# Patient Record
Sex: MALE | Race: WHITE | NOT HISPANIC OR LATINO | Employment: FULL TIME | URBAN - METROPOLITAN AREA
[De-identification: names, ages, dates, MRNs, and addresses within clinical notes are randomized per-mention and may not be internally consistent; named-entity substitution may affect disease eponyms.]

---

## 2018-10-05 ENCOUNTER — TRANSCRIBE ORDERS (OUTPATIENT)
Dept: OCCUPATIONAL THERAPY | Facility: CLINIC | Age: 33
End: 2018-10-05

## 2018-10-05 DIAGNOSIS — S46.912D STRAIN OF LEFT SHOULDER, SUBSEQUENT ENCOUNTER: Primary | ICD-10-CM

## 2018-10-09 ENCOUNTER — TREATMENT (OUTPATIENT)
Dept: PHYSICAL THERAPY | Facility: CLINIC | Age: 33
End: 2018-10-09

## 2018-10-09 DIAGNOSIS — S46.912D STRAIN OF LEFT SHOULDER, SUBSEQUENT ENCOUNTER: Primary | ICD-10-CM

## 2018-10-09 PROCEDURE — 97161 PT EVAL LOW COMPLEX 20 MIN: CPT | Performed by: PHYSICAL THERAPIST

## 2018-10-09 PROCEDURE — 97110 THERAPEUTIC EXERCISES: CPT | Performed by: PHYSICAL THERAPIST

## 2018-10-09 PROCEDURE — 97530 THERAPEUTIC ACTIVITIES: CPT | Performed by: PHYSICAL THERAPIST

## 2018-10-09 PROCEDURE — 97035 APP MDLTY 1+ULTRASOUND EA 15: CPT | Performed by: PHYSICAL THERAPIST

## 2018-10-09 PROCEDURE — 97033 APP MDLTY 1+IONTPHRSIS EA 15: CPT | Performed by: PHYSICAL THERAPIST

## 2018-10-09 NOTE — PROGRESS NOTES
Physical Therapy Initial Evaluation and Plan of Care    Patient: Florin Edmonds   : 1985  Diagnosis/ICD-10 Code:  Strain of left shoulder, subsequent encounter [S46.912D]  Referring practitioner: GEOFF Blake    Subjective Evaluation    History of Present Illness  Date of onset: 2018  Mechanism of injury: Partner dropped other end of sofa, which caused his end to pull him forward with arm down to floor. - Immediate pain in anterior shoulder.  Finished rest of the deliveries, reported issue the next morning  BW 18 - Flexeril ,  BW 10/3/18 - Voltaren and restrictions    NO specific outside activities, hikes      Patient Occupation:  - [employer] - 4 years   Precautions and Work Restrictions: off due to no restricted duty work availablePain  Current pain ratin  At best pain ratin  At worst pain ratin  Location: Anterior shoulder sharp pain, pops, throbs througjhout the shoulder, no sense of locking or instability  Quality: throbbing, sharp, knife-like, dull ache and tight  Relieving factors: rest, support, change in position and medications  Aggravating factors: outstretched reach and overhead activity (abduction rotation positioning , arm behind back)  Progression: improved    Social Support  Lives with: spouse    Hand dominance: right    Diagnostic Tests  No diagnostic tests performed    Treatments  Previous treatment: medication  Current treatment: medication and physical therapy  Patient Goals  Patient goal: Back to work without pain       Objective       Postural Observations  Seated posture: fair  Standing posture: fair    Additional Postural Observation Details  Protracted shoulders, downward rotation as well    Observations   Left Shoulder   Positive for atrophy.     Additional Observation Details  Mild atrophy in infraspinatus    Palpation   Left   Tenderness of the biceps and supraspinatus.     Tenderness     Left Shoulder   Tenderness in the biceps  "tendon (proximal), bicipital groove and supraspinatus tendon. No tenderness in the AC joint.     Cervical/Thoracic Screen   Cervical range of motion within normal limits    Neurological Testing     Sensation     Shoulder   Left Shoulder   Intact: light touch    Active Range of Motion   Left Shoulder   Flexion: 170 degrees   Abduction: 170 degrees   External rotation 90°: 90 degrees   Internal rotation 90°: 65 degrees with pain    Scapular Mobility   Left Shoulder   Scapular Mobility with Shoulder to 90° FF   Scapular winging: minimal  Downward rotation: excessive    Strength/Myotome Testing     Left Shoulder     Planes of Motion   Flexion: 4   Extension: 4+   Abduction: 4-   External rotation at 0°: 4-   Internal rotation at 0°: 5     Isolated Muscles   Supraspinatus: 4-     Tests     Left Shoulder   Positive empty can and Hawkin's.   Negative AC shear, crank, crossover, drop arm, Speed's and sulcus sign.      Assessment & Plan     Assessment  Impairments: abnormal muscle firing, abnormal muscle tone, abnormal or restricted ROM, activity intolerance, impaired physical strength, lacks appropriate home exercise program, pain with function and safety issue  Assessment details: 33 y.o. Male with right shoulder strain after lifting a sofa when delivery partner lost control and dropped his load presents with\" 1. Intermittent left anterior/superior shoulder pain, 2. Near full motion with slight limitation in IR, 3. Tenderness to palpation of the supraspinatus and biceps tendons, 4. Positive Impingement signs as well as positive Empty Can test, 5. Postural dysfunction, 6. History of right shoulder pathology, 7. 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  Patient will have functional pain free shoulder AROM  Patient will be able to lift and carry 50# without increased symptoms  Patient will return to work with min/no restrictions        Plan  Therapy options: will be seen for skilled physical therapy services  Planned modality interventions: ultrasound and iontophoresis  Planned therapy interventions: manual therapy, soft tissue mobilization, strengthening, stretching, home exercise program and therapeutic activities  Frequency: 3x week  Duration in visits: 12  Duration in weeks: 3  Treatment plan discussed with: patient  Plan details: Patient issued written HEP of exercises performed in clinic today      Manual Therapy:    0     mins  41793;  Therapeutic Exercise:    20     mins  94563;     Neuromuscular Phuc:    0    mins  79895;    Therapeutic Activity:     10     mins  89138;   Discussion of proper posture, anatomy review with model, probable causes of symptoms and rehab process    Evaluation Time:     25  mins  Timed Treatment:   53   mins   Total Treatment:     90   mins    PT SIGNATURE: Latoya Celaya, PT   DATE TREATMENT INITIATED: 10/9/2018    Initial Certification  Certification Period: 1/7/2019  I certify that the therapy services are furnished while this patient is under my care.  The services outlined above are required by this patient, and will be reviewed every 90 days.     PHYSICIAN: Valente Correa PA ________________________________     DATE: ____________________________-    Please sign and return via fax to [phone].. Thank you, [facility].

## 2018-10-10 ENCOUNTER — TREATMENT (OUTPATIENT)
Dept: PHYSICAL THERAPY | Facility: CLINIC | Age: 33
End: 2018-10-10

## 2018-10-10 DIAGNOSIS — S46.912D STRAIN OF LEFT SHOULDER, SUBSEQUENT ENCOUNTER: Primary | ICD-10-CM

## 2018-10-10 PROCEDURE — 97033 APP MDLTY 1+IONTPHRSIS EA 15: CPT | Performed by: PHYSICAL THERAPIST

## 2018-10-10 PROCEDURE — 97035 APP MDLTY 1+ULTRASOUND EA 15: CPT | Performed by: PHYSICAL THERAPIST

## 2018-10-10 PROCEDURE — 97110 THERAPEUTIC EXERCISES: CPT | Performed by: PHYSICAL THERAPIST

## 2018-10-10 NOTE — PROGRESS NOTES
Physical Therapy Daily Progress Note    VISIT#: 2    Subjective   Florin Edmonds reports: that his shoulder is sore this morning.  States that the tape feels supportive.  Notes that it did not start bothering him until this morning.       Objective   Tenderness in biceps and supraspinatus tendons    Positive Rodrigo Brent Impingement    See Exercise, Manual, and Modality Logs for complete treatment.     Patient Education:Posture    Assessment/Plan  Some relief with tape in place for support.  Needs improved posture.  Is symptomatic on his right shoulder as well from old injury so should benefit from scapular stabilization bilaterally.    Progress strengthening /stabilization /functional activity           Manual Therapy:    3     mins  51213;  Therapeutic Exercise:    25/40     mins  82767;     Neuromuscular Phuc:    0    mins  12893;    Therapeutic Activity:     0     mins  82553;       Timed Treatment:   51   mins   Total Treatment:     70   mins    Latoya Celaya, PT  [place] License # [id number]  Physical Therapist

## 2018-10-11 ENCOUNTER — TREATMENT (OUTPATIENT)
Dept: PHYSICAL THERAPY | Facility: CLINIC | Age: 33
End: 2018-10-11

## 2018-10-11 DIAGNOSIS — S46.912D STRAIN OF LEFT SHOULDER, SUBSEQUENT ENCOUNTER: Primary | ICD-10-CM

## 2018-10-11 PROCEDURE — 97110 THERAPEUTIC EXERCISES: CPT | Performed by: PHYSICAL THERAPIST

## 2018-10-11 PROCEDURE — 97033 APP MDLTY 1+IONTPHRSIS EA 15: CPT | Performed by: PHYSICAL THERAPIST

## 2018-10-11 PROCEDURE — 97035 APP MDLTY 1+ULTRASOUND EA 15: CPT | Performed by: PHYSICAL THERAPIST

## 2018-10-11 NOTE — PROGRESS NOTES
Physical Therapy Daily Progress Note    VISIT#: 3    Subjective   Florin Edmonds reports: that his shoulder was more sore yesterday with a sharp pain in the superior/lateral aspect of the shoulder.  Sharp pain only lasted a few seconds.  States that his WC  was going to schedule an MRI for him.        Objective   Tenderness to palpation in the biceps and SS tendons    See Exercise, Manual, and Modality Logs for complete treatment.     Patient Education: Posture    Assessment/Plan  Patient with persistent signs of impingement in the shoulder and potential rotator/biceps pathology.    To MD after next session.           Manual Therapy:    3     mins  44276;  Therapeutic Exercise:    25/35     mins  74186;     Neuromuscular Phuc:    0    mins  86575;    Therapeutic Activity:     0     mins  25195;       Timed Treatment:   51   mins   Total Treatment:     65   mins    Latoya Celaya, PT  [place] License # [id number]  Physical Therapist

## 2018-10-12 ENCOUNTER — TREATMENT (OUTPATIENT)
Dept: PHYSICAL THERAPY | Facility: CLINIC | Age: 33
End: 2018-10-12

## 2018-10-12 DIAGNOSIS — S46.912D STRAIN OF LEFT SHOULDER, SUBSEQUENT ENCOUNTER: Primary | ICD-10-CM

## 2018-10-12 PROCEDURE — 97035 APP MDLTY 1+ULTRASOUND EA 15: CPT | Performed by: PHYSICAL THERAPIST

## 2018-10-12 PROCEDURE — 97530 THERAPEUTIC ACTIVITIES: CPT | Performed by: PHYSICAL THERAPIST

## 2018-10-12 PROCEDURE — 97110 THERAPEUTIC EXERCISES: CPT | Performed by: PHYSICAL THERAPIST

## 2018-10-12 PROCEDURE — 97033 APP MDLTY 1+IONTPHRSIS EA 15: CPT | Performed by: PHYSICAL THERAPIST

## 2018-10-12 NOTE — PROGRESS NOTES
"MD Letter    Date of Initial Visit: Type: THERAPY  Noted: 10/9/2018  Today's Date: 10/12/2018  Patient seen for 4 sessions    Treatment has included: therapeutic exercise, manual therapy, ultrasound, iontophoresis and positional taping    Subjective   States that his shoulder pain seems to be increasing.  States that he has a sharp pain in the anterior shoulder and has a \"pulsating pain\"  in the superior shoulder.  He states that the pain awakened him from sleep last night.  Reports popping in the superior shoulder with the arm in abducted and rotated.      Objective - he presents with protracted shoulder posture  Shoulder AROM - full in all planes of motion but has pain at end ranges of flexion, abduction and internal rotation  Strength - 4/5 in abduction and external rotation but is limited by pain  Sensation - intact  Palpation - tenderness in the biceps tendon, supraspinatus tendon and sense of popping in the shoulder with elevation   Special tests - positive Empty Can, Speeds, O'Briens and Patel Brent Impingement tests  Activity tolerances - he is able to do all self care skills with minimal pain but has pain with reaching out to the side to even  a drinking glass.  He is able to lift 20# floor to waist without pain but has pian with lifting even 15# to chest level.    Assessment/Plan  Patient has demonstrated minimal improvement since the initiation of therapy.  The pain has  Actually increased in the past few days.  The motion has increased to full.  The activity tolerances have remained quite limited compared to the demands of his job as a .  I feel that the patient would benefit from further medical management.  If you have any questions concerning the care, please do not hesitate to contact me.          PT Signature: Latoya Celaya, PT        Manual Therapy:    3     mins  39996;  Therapeutic Exercise:    15     mins  25809;     Neuromuscular Phuc:    0    mins  09857;  "   Therapeutic Activity:     10     mins  09707;   Assessed for MD    Timed Treatment:   51   mins   Total Treatment:     65   mins

## 2018-11-06 ENCOUNTER — TREATMENT (OUTPATIENT)
Dept: PHYSICAL THERAPY | Facility: CLINIC | Age: 33
End: 2018-11-06

## 2018-11-06 DIAGNOSIS — M75.82 ROTATOR CUFF TENDONITIS, LEFT: Primary | ICD-10-CM

## 2018-11-06 DIAGNOSIS — M75.42 IMPINGEMENT SYNDROME OF LEFT SHOULDER: ICD-10-CM

## 2018-11-06 PROCEDURE — 97110 THERAPEUTIC EXERCISES: CPT | Performed by: PHYSICAL THERAPIST

## 2018-11-06 PROCEDURE — 97033 APP MDLTY 1+IONTPHRSIS EA 15: CPT | Performed by: PHYSICAL THERAPIST

## 2018-11-06 PROCEDURE — 97140 MANUAL THERAPY 1/> REGIONS: CPT | Performed by: PHYSICAL THERAPIST

## 2018-11-06 PROCEDURE — 97161 PT EVAL LOW COMPLEX 20 MIN: CPT | Performed by: PHYSICAL THERAPIST

## 2018-11-06 NOTE — PROGRESS NOTES
Physical Therapy Initial Evaluation and Plan of Care    Patient: Florin Edmonds   : 1985  Diagnosis/ICD-10 Code:  Rotator cuff tendonitis, left [M75.82]  Referring practitioner: Stewart Arreguin MD    Subjective Evaluation    History of Present Illness  Date of onset: 2018  Mechanism of injury: Carrying heavy sofa, partner dropped it causing all weight to go to his left arm pulling him down to the ground.  Immediate pain.  BW next day - anti inflammatories and restriction  PT the next week - some improvement - referred to Dr. Arreguin  MRI - Tendinopathy of RTC - Impingement syndrome, Did not note any true specific tearing per patient  Now taking Meloxicam daily and referred back to PT  Still off work based on restrictions  Has not continued his exercises as he was referred to ortho        Patient Occupation:  - [employer] x 4 years   Precautions and Work Restrictions: No lifting > 10-20#Pain  Current pain ratin  At best pain ratin  At worst pain ratin  Location: Superior/anterior shoulder, shoots into deltoid region  Quality: dull ache, throbbing, radiating, knife-like and pressure  Relieving factors: change in position, medications, rest and support  Aggravating factors: lifting, outstretched reach, overhead activity and repetitive movement (driving, clapping)  Progression: improved    Social Support  Lives with: spouse    Hand dominance: right    Diagnostic Tests  MRI studies: abnormal (tendinopathy)    Treatments  Previous treatment: medication and physical therapy  Current treatment: medication and physical therapy  Patient Goals  Patient goals for therapy: return to work  Patient goal: cleared for normal activiites           Objective       Postural Observations    Additional Postural Observation Details  Slight protraction of shoulders    Observations     Additional Observation Details  Blemishes noted in upper arm    Palpation   Left   Hypertonic in  the upper trapezius.   Tenderness of the latissimus and supraspinatus.     Tenderness     Left Shoulder   Tenderness in the infraspinatus tendon and subscapularis tendon.     Neurological Testing     Sensation     Shoulder   Left Shoulder   Intact: light touch    Active Range of Motion   Left Shoulder   Flexion: 170 degrees   Abduction: 170 degrees   External rotation 90°: 85 degrees   Internal rotation BTB: L2     Additional Active Range of Motion Details  Slight pain with end range IR    Strength/Myotome Testing     Left Shoulder     Planes of Motion   Flexion: 4+   Extension: 5   Abduction: 4   Adduction: 4+   External rotation at 0°: 4+   Internal rotation at 0°: 4+     Tests     Left Shoulder   Positive empty can, Hawkin's and painful arc.   Negative crossover, drop arm and Speed's.          Assessment & Plan     Assessment  Impairments: abnormal or restricted ROM, activity intolerance, impaired physical strength, lacks appropriate home exercise program and pain with function  Assessment details: 33 y.o. Male with left shoulder RTC tendinopathy after lifting a heavy sofa presents with: 1. Intermittent left shoulder pain, 2.  Slightly decreased shoulder motion, 3. Pain with outstretched and overhead reaching, 4. Positive Impingement signs,  5. Pain with Empty Can testing, 6. Decreased tolerance for heavy lifting as required for his job as a   Prognosis: good  Functional Limitations: carrying objects, lifting, pulling, pushing, reaching overhead and unable to perform repetitive tasks  Goals  Plan Goals: Short Term Goals: 2 weeks  Patient will be able to tolerate initial exercises  Patient will have pain <5/10  Patient will be able to reach behind his back without increased symptoms  Patient will be able to lift 20# to waist without increased symptoms    Long Term Goals: 6 weeks  Patient will be independent in performing home exercise program.  Patient will have functional pain free  shoulder AROM  Patient will be able to lift 50# to waist level without pain  Patient will return to work with min/no restrictions        Plan  Planned modality interventions: iontophoresis and cryotherapy  Planned therapy interventions: manual therapy, joint mobilization, home exercise program, strengthening and stretching  Frequency: 2x week  Duration in visits: 12  Duration in weeks: 6  Treatment plan discussed with: patient        Manual Therapy:    11     mins  72436;  Therapeutic Exercise:    20/30     mins  30765;     Neuromuscular Phuc:    0    mins  77687;    Therapeutic Activity:     0     mins  37478;       Evaluation Time:     20  mins  Timed Treatment:   46   mins   Total Treatment:     85   mins    PT SIGNATURE: Latoya Celaya, PT   DATE TREATMENT INITIATED: 11/6/2018    Initial Certification  Certification Period: 2/4/2019  I certify that the therapy services are furnished while this patient is under my care.  The services outlined above are required by this patient, and will be reviewed every 90 days.     PHYSICIAN: Stewart Arreguin MD____________________________________________      DATE: ________________________     Please sign and return via fax to [phone].. Thank you, [facility].

## 2018-11-13 ENCOUNTER — TREATMENT (OUTPATIENT)
Dept: PHYSICAL THERAPY | Facility: CLINIC | Age: 33
End: 2018-11-13

## 2018-11-13 DIAGNOSIS — M75.82 ROTATOR CUFF TENDONITIS, LEFT: Primary | ICD-10-CM

## 2018-11-13 DIAGNOSIS — M75.42 IMPINGEMENT SYNDROME OF LEFT SHOULDER: ICD-10-CM

## 2018-11-13 PROCEDURE — 97110 THERAPEUTIC EXERCISES: CPT | Performed by: PHYSICAL THERAPIST

## 2018-11-13 NOTE — PROGRESS NOTES
Physical Therapy Daily Progress Note    VISIT#: 2    Subjective   Florin Edmonds reports: that his shoulder is feeling better but notes that he has not lifted anything heavier than groceries.  Able to lie on the shoulder at night without pain. States that he still does have some popping in the shoulder but not pain with the pop.    Objective   Moderate tenderness in biceps tendon and minimal in supraspinatus tendon    See Exercise, Manual, and Modality Logs for complete treatment.     Patient Education: Posture    Assessment/Plan  Much improved function of the shoulder.  Tolerated strengthening exercises without increased pain.     Progress strengthening /stabilization /functional activity           Manual Therapy:    0     mins  18646;  Therapeutic Exercise:    40/55     mins  48473;     Neuromuscular Phuc:    0    mins  72945;    Therapeutic Activity:     0     mins  45729;       Timed Treatment:   40   mins   Total Treatment:     75   mins    Latoya Celaya, PT  [place] License # [id number]  Physical Therapist

## 2018-11-16 ENCOUNTER — TREATMENT (OUTPATIENT)
Dept: PHYSICAL THERAPY | Facility: CLINIC | Age: 33
End: 2018-11-16

## 2018-11-16 DIAGNOSIS — M75.42 IMPINGEMENT SYNDROME OF LEFT SHOULDER: ICD-10-CM

## 2018-11-16 DIAGNOSIS — M75.82 ROTATOR CUFF TENDONITIS, LEFT: Primary | ICD-10-CM

## 2018-11-16 PROCEDURE — 97110 THERAPEUTIC EXERCISES: CPT | Performed by: PHYSICAL THERAPIST

## 2018-11-16 NOTE — PROGRESS NOTES
Physical Therapy Daily Progress Note    VISIT#: 3    Subjective   Florin Edmonds reports: that his shoulder is feeling better.  States that he still has not done anything heavy but has had no sharp pain.       Objective   Full motion with slight pain at end range IR    Palpable pop/click with elevation    See Exercise, Manual, and Modality Logs for complete treatment.     Patient Education: need for scapular stabilization    Assessment/Plan  Patient with improved scapular stability as he did not have much difficulty with weighted ball on the wall today.    Progress strengthening /stabilization /functional activity           Manual Therapy:    0     mins  34281;  Therapeutic Exercise:    40/60     mins  11837;     Neuromuscular Phuc:    0    mins  65590;    Therapeutic Activity:     0     mins  81325;       Timed Treatment:   40   mins   Total Treatment:     70   mins    Latoya Celaya, PT  [place] License # [id number]  Physical Therapist

## 2018-11-19 ENCOUNTER — TREATMENT (OUTPATIENT)
Dept: PHYSICAL THERAPY | Facility: CLINIC | Age: 33
End: 2018-11-19

## 2018-11-19 DIAGNOSIS — M75.42 IMPINGEMENT SYNDROME OF LEFT SHOULDER: ICD-10-CM

## 2018-11-19 DIAGNOSIS — M75.82 ROTATOR CUFF TENDONITIS, LEFT: Primary | ICD-10-CM

## 2018-11-19 PROCEDURE — 97110 THERAPEUTIC EXERCISES: CPT | Performed by: PHYSICAL THERAPIST

## 2018-11-19 PROCEDURE — 97140 MANUAL THERAPY 1/> REGIONS: CPT | Performed by: PHYSICAL THERAPIST

## 2018-11-19 NOTE — PROGRESS NOTES
Physical Therapy Daily Progress Note    VISIT#: 4    Subjective   Florin Kendal reports: that his arm feels good but notes that he has not been doing any heavy activity at home.  States that he is eager to RTW.      Objective   Shoulder equals LUE ezcept for IR which is only slightly limited    See Exercise, Manual, and Modality Logs for complete treatment.     Patient Education: Need for increased lifting    Assessment/Plan  Florin continues to show progress in shoulder rehab from impingement of RTC tendons.  Needs to increase strength to be able to lift furniture as a  from [employer].      Progress strengthening /stabilization /functional activity           Manual Therapy:    11     mins  96358;  Therapeutic Exercise:    40/50     mins  69178;     Neuromuscular Phuc:    0    mins  90557;    Therapeutic Activity:     0     mins  46785;       Timed Treatment:   51   mins   Total Treatment:     80   mins    Latoya Celaya, PT  [place] License # [id number]  Physical Therapist

## 2018-11-21 ENCOUNTER — TREATMENT (OUTPATIENT)
Dept: PHYSICAL THERAPY | Facility: CLINIC | Age: 33
End: 2018-11-21

## 2018-11-21 DIAGNOSIS — S46.912D STRAIN OF LEFT SHOULDER, SUBSEQUENT ENCOUNTER: ICD-10-CM

## 2018-11-21 DIAGNOSIS — M75.82 ROTATOR CUFF TENDONITIS, LEFT: Primary | ICD-10-CM

## 2018-11-21 DIAGNOSIS — M75.42 IMPINGEMENT SYNDROME OF LEFT SHOULDER: ICD-10-CM

## 2018-11-21 PROCEDURE — 97140 MANUAL THERAPY 1/> REGIONS: CPT | Performed by: PHYSICAL THERAPIST

## 2018-11-21 PROCEDURE — 97110 THERAPEUTIC EXERCISES: CPT | Performed by: PHYSICAL THERAPIST

## 2018-11-21 NOTE — PROGRESS NOTES
Physical Therapy Daily Progress Note    Visit # : 5  Florin Edmonds reports: my shoulder is feeling better but my FA is sore from ex program.     Subjective     Objective   See Exercise, Manual, and Modality Logs for complete treatment.   No palp tenderness R wrist extensors/brachioradialis following manual interventions/ex.    Assessment/Plan  Pt demonstrated good tolerance of exercises today though did become fatigued after completing 3/4 of program. FA complaints which were likely due to introduction of box lifts/carries last visits in order to prepare for critical job demands as a [employer] .   Progress strengthening /stabilization /functional activity  Progress weight          Manual Therapy:    10     mins  49156;  Therapeutic Exercise:    40/50     mins  67373;     Neuromuscular Phuc:    -    mins  81210;    Therapeutic Activity:     -     mins  14058;     Gait Training:      -     mins  90247;     Ultrasound:     -     mins  55669;    Electrical Stimulation:    -     mins  00165 ( );  Iontophoresis                 -     mins 82005      Timed Treatment:   50   mins   Total Treatment:     70   mins        Estephania Gary PT  Physical Therapist  [place] License # [id number]

## 2018-11-26 ENCOUNTER — TREATMENT (OUTPATIENT)
Dept: PHYSICAL THERAPY | Facility: CLINIC | Age: 33
End: 2018-11-26

## 2018-11-26 NOTE — PROGRESS NOTES
MD Letter - Discharge Summary    Date of Initial Visit: 11/6/18  Today's Date: 11/26/2018  Patient seen for 6 sessions    Treatment has included: therapeutic exercise, manual therapy, therapeutic activity and cryotherapy    Subjective   Florin states that his shoulder is much better than it was a few weeks ago.  He states that he still has occasional popping in the shoulder but it is no longer painful.  He does report a slight pinching sensation at times with elevation, but is not consistent.  He states that he feels that he is ready to return to his regular job as a  for [employer].     Objective   Shoulder AROM – full in all planes of motion with a slight pinch at end range IR  Strength – 5/5 throughout the shoulder  Sensation –intact  Palpation – tenderness in the biceps tendon and the anterior/lateral subacromial space  Special tests – slightly positive Empty can and Patel Brent tests  Activity tolerances -Flroin is able to lift and carry 50# from floor to waist level and 40# to shoulder level without increased pain.    Assessment/Plan  Patient has demonstrated significant improvement since the initiation of therapy.  The pain has  Nearly resolved.  The motion has returned to full.  The activity tolerances have returned to work demand level.  I feel that the patient would benefit from discontinuing his formal therapy but discontinuing his HEP and progression back to the gym.  If you have any questions concerning the care, please do not hesitate to contact me.          PT Signature: Latoya Celaya, PT

## 2018-11-27 ENCOUNTER — TREATMENT (OUTPATIENT)
Dept: PHYSICAL THERAPY | Facility: CLINIC | Age: 33
End: 2018-11-27

## 2018-11-27 DIAGNOSIS — M75.42 IMPINGEMENT SYNDROME OF LEFT SHOULDER: ICD-10-CM

## 2018-11-27 DIAGNOSIS — M75.82 ROTATOR CUFF TENDONITIS, LEFT: Primary | ICD-10-CM

## 2018-11-27 PROCEDURE — 97530 THERAPEUTIC ACTIVITIES: CPT | Performed by: PHYSICAL THERAPIST

## 2018-11-27 PROCEDURE — 97110 THERAPEUTIC EXERCISES: CPT | Performed by: PHYSICAL THERAPIST

## 2018-11-27 NOTE — PROGRESS NOTES
Physical Therapy Daily Progress Note    VISIT#: 6    Subjective   Florin Edmonds reports: that his MD wanted him to continue therapy for one more week as he wanted him to do more heavy lifting.  He has relaeased him to full duty 12/3/18.      Objective   Full motion without pain today    Slight pain with resisted abduction    Slight weakness in shoulder flexion    See Exercise, Manual, and Modality Logs for complete treatment.     Patient Education: Need to increase resistance with weights    Assessment/Plan  Florin has full motion with minimal pain but based on heavy demands of his job will need an additional week of strengthening.    Progress strengthening /stabilization /functional activity           Manual Therapy:    0     mins  43412;  Therapeutic Exercise:    40/50     mins  11728;     Neuromuscular Phuc:    0    mins  28260;    Therapeutic Activity:     10     mins  12581;       Timed Treatment:   50   mins   Total Treatment:     90   mins    Latoya Celaya, PT  [place] License # [id number]  Physical Therapist

## 2018-11-29 ENCOUNTER — TREATMENT (OUTPATIENT)
Dept: PHYSICAL THERAPY | Facility: CLINIC | Age: 33
End: 2018-11-29

## 2018-11-29 DIAGNOSIS — M75.82 ROTATOR CUFF TENDONITIS, LEFT: Primary | ICD-10-CM

## 2018-11-29 DIAGNOSIS — M75.42 IMPINGEMENT SYNDROME OF LEFT SHOULDER: ICD-10-CM

## 2018-11-29 PROCEDURE — 97140 MANUAL THERAPY 1/> REGIONS: CPT | Performed by: PHYSICAL THERAPIST

## 2018-11-29 PROCEDURE — 97110 THERAPEUTIC EXERCISES: CPT | Performed by: PHYSICAL THERAPIST

## 2018-11-29 NOTE — PROGRESS NOTES
Physical Therapy Daily Progress Note    VISIT#: 7    Subjective   Florin Edmonds reports: that he was not sore after his last session.  States that he is encouraged now about RTW next week.       Objective   Full motion except for IR which is slightly limited    See Exercise, Manual, and Modality Logs for complete treatment.     Patient Education: need for continued exercise after therapy    Assessment/Plan  Florin continues to do well with shoulder rehab.  Able to increase weights and reps of exercise and no noted increase in symptoms.    Progress strengthening /stabilization /functional activity           Manual Therapy:    10     mins  03767;  Therapeutic Exercise:    40/55     mins  92422;     Neuromuscular Phuc:    0    mins  04033;    Therapeutic Activity:     0     mins  31557;       Timed Treatment:   50   mins   Total Treatment:     80   mins    Latoya Celaya, PT  [place] License # [id number]  Physical Therapist

## 2018-11-30 ENCOUNTER — TREATMENT (OUTPATIENT)
Dept: PHYSICAL THERAPY | Facility: CLINIC | Age: 33
End: 2018-11-30

## 2018-11-30 DIAGNOSIS — M75.82 ROTATOR CUFF TENDONITIS, LEFT: Primary | ICD-10-CM

## 2018-11-30 DIAGNOSIS — M75.42 IMPINGEMENT SYNDROME OF LEFT SHOULDER: ICD-10-CM

## 2018-11-30 PROCEDURE — 97110 THERAPEUTIC EXERCISES: CPT | Performed by: PHYSICAL THERAPIST

## 2018-11-30 PROCEDURE — 97530 THERAPEUTIC ACTIVITIES: CPT | Performed by: PHYSICAL THERAPIST

## 2018-11-30 NOTE — PROGRESS NOTES
Physical Therapy Daily Progress Note    VISIT#: 8    Subjective   Florin Edmonds reports: that he feels good and thinks that he is ready to RTW.  NO pain or soreness after last session.      Objective   Full shoulder motion    Persistent protracted shoulder posturing but improves with attention to posture    Strength - 5/5 throughout shoulder mm    Able to lift and carry 100# today F-w, carry 30 feet    See Exercise, Manual, and Modality Logs for complete treatment.     Patient Education: Posture - continue HEP    Assessment/Plan  Florin has done extremely well with therapy.   His motion is full and his strength is 5/5.  He is independent in his HEP.  All goals have been met.     DC PT at this time as patient is RTW next week           Manual Therapy:    0     mins  09737;  Therapeutic Exercise:    40/50     mins  38666;     Neuromuscular Phuc:    0    mins  40689;    Therapeutic Activity:     10     mins  11221;   Assessed for work status    Timed Treatment:   50   mins   Total Treatment:     70   mins    Latoya Celaya, PT  [place] License # [id number]  Physical Therapist

## 2019-10-01 ENCOUNTER — TRANSCRIBE ORDERS (OUTPATIENT)
Dept: OCCUPATIONAL THERAPY | Facility: CLINIC | Age: 34
End: 2019-10-01

## 2019-10-01 DIAGNOSIS — S46.912S LEFT SHOULDER STRAIN, SEQUELA: Primary | ICD-10-CM

## 2019-10-02 ENCOUNTER — TREATMENT (OUTPATIENT)
Dept: PHYSICAL THERAPY | Facility: CLINIC | Age: 34
End: 2019-10-02

## 2019-10-02 DIAGNOSIS — S46.912S LEFT SHOULDER STRAIN, SEQUELA: ICD-10-CM

## 2019-10-02 DIAGNOSIS — S46.912D STRAIN OF LEFT SHOULDER, SUBSEQUENT ENCOUNTER: Primary | ICD-10-CM

## 2019-10-02 PROCEDURE — 97110 THERAPEUTIC EXERCISES: CPT | Performed by: PHYSICAL THERAPIST

## 2019-10-02 PROCEDURE — 97035 APP MDLTY 1+ULTRASOUND EA 15: CPT | Performed by: PHYSICAL THERAPIST

## 2019-10-02 PROCEDURE — 97161 PT EVAL LOW COMPLEX 20 MIN: CPT | Performed by: PHYSICAL THERAPIST

## 2019-10-02 PROCEDURE — 97033 APP MDLTY 1+IONTPHRSIS EA 15: CPT | Performed by: PHYSICAL THERAPIST

## 2019-10-02 NOTE — PROGRESS NOTES
Physical Therapy Initial Evaluation and Plan of Care    Patient: Florin Edmonds   : 1985  Diagnosis/ICD-10 Code:  Strain of left shoulder, subsequent encounter [S46.912D]  Referring practitioner: Hussein Rodriguez MD    Subjective Evaluation    History of Present Illness  Date of onset: 2019  Mechanism of injury: Pulling metal legs with arms extended felt pop in left shoulder and had pain with the pop.  Pain increased and started shooting through the anterior and lateral shoulder.  BW next morning - ibu 800 and restrictions  Returned to MD yesterday - now on pred pack and restrictions  Had previous injury - similar 2018  NO specific outside activities - exhausted after work       Patient Occupation:  delivery [employer]   Precautions and Work Restrictions: no lifting >10#Pain  Current pain ratin  At best pain ratin  At worst pain ratin  Location: anterior and lateral shoulder, pops with abduction  Quality: sharp, dull ache and tight  Relieving factors: rest and ice  Aggravating factors: repetitive movement, outstretched reach, sleeping, lifting and overhead activity  Progression: no change    Hand dominance: right    Diagnostic Tests  No diagnostic tests performed    Treatments  Previous treatment: medication  Current treatment: medication and physical therapy  Patient Goals  Patient goal: shoulder to feel better       Objective       Postural Observations  Seated posture: fair  Standing posture: fair    Additional Postural Observation Details  Slight protraction of shoulders, no muscle wasting in shoulder    Palpation   Left   No palpable tenderness to the infraspinatus and triceps.   Hypertonic in the upper trapezius.   Tenderness of the anterior deltoid, biceps and supraspinatus.     Tenderness     Left Shoulder   Tenderness in the biceps tendon (proximal), bicipital groove and supraspinatus tendon.     Additional Tenderness Details  Most tenderness in the proximal  biceps tendon    Neurological Testing     Sensation     Shoulder   Left Shoulder   Intact: light touch    Active Range of Motion   Left Shoulder   Normal active range of motion    Additional Active Range of Motion Details  Pain and popping with elevation and IR    Joint Play   Left Shoulder  Joints within functional limits are the anterior capsule and posterior capsule.     Strength/Myotome Testing     Left Shoulder     Planes of Motion   Flexion: 4 (slight pain inhibition)   Extension: 5   Abduction: 4 (pain inhibition)   External rotation at 0°: 4+   Internal rotation at 0°: 5     Tests     Left Shoulder   Positive empty can, Hawkin's and Speed's.   Negative drop arm.      Assessment & Plan     Assessment  Impairments: activity intolerance, impaired physical strength, lacks appropriate home exercise program and pain with function  Assessment details: 34 y.o. Male with left shoulder strain after pulling a heavy object presents with: 1. Intermittent left shoulder pain, 2. Full active and passive shoulder motion, 3. Slight weakness in elevation and ER with pain inhibition with testing, 4. Tenderness to palpation in the biceps tendon and supraspinatus tendon insertion, 5. Positive Impingement signs, 6. 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  Patient will have functional pain free shoulder AROM  Patient will be able to lift and carry 50# without increased symptoms  Patient will return to work with min/no  restrictions        Plan  Therapy options: will be seen for skilled physical therapy services  Planned modality interventions: iontophoresis and ultrasound  Planned therapy interventions: manual therapy, strengthening, stretching, home exercise program and postural training  Frequency: 3x week  Duration in visits: 12  Duration in weeks: 4  Treatment plan discussed with: patient  Plan details: Patient issued tubing to perform his HEP    Manual Therapy:    0     mins  09916;  Therapeutic Exercise:    25     mins  46782;     Neuromuscular Phuc:    0    mins  75357;    Therapeutic Activity:     0     mins  83558;       Evaluation Time:     20  mins  Timed Treatment:   48   mins   Total Treatment:     75   mins    PT SIGNATURE: Latoya Celaya, PT   DATE TREATMENT INITIATED: 10/2/2019    Initial Certification  Certification Period: 12/31/2019  I certify that the therapy services are furnished while this patient is under my care.  The services outlined above are required by this patient, and will be reviewed every 90 days.     PHYSICIAN: Hussein Rodriguez MD ________________________________      DATE: ________________________    Please sign and return via fax to [phone].. Thank you, [facility].

## 2019-10-03 ENCOUNTER — TREATMENT (OUTPATIENT)
Dept: PHYSICAL THERAPY | Facility: CLINIC | Age: 34
End: 2019-10-03

## 2019-10-03 DIAGNOSIS — S46.912D STRAIN OF LEFT SHOULDER, SUBSEQUENT ENCOUNTER: Primary | ICD-10-CM

## 2019-10-03 PROCEDURE — 97110 THERAPEUTIC EXERCISES: CPT | Performed by: PHYSICAL THERAPIST

## 2019-10-03 PROCEDURE — 97033 APP MDLTY 1+IONTPHRSIS EA 15: CPT | Performed by: PHYSICAL THERAPIST

## 2019-10-03 PROCEDURE — 97035 APP MDLTY 1+ULTRASOUND EA 15: CPT | Performed by: PHYSICAL THERAPIST

## 2019-10-03 NOTE — PROGRESS NOTES
Physical Therapy Daily Progress Note    VISIT#: 2    Subjective   Florin Edmonds reports: that he still has the soreness in his arm with rotation and abduction.      Objective   Tenderness in anterior/lateral subacromial space    See Exercise, Manual, and Modality Logs for complete treatment.     Patient Education: posture    Assessment/Plan  Persistent sharp pain with elevation.  Poor posture causing further impingement in the shoulder.    Progress strengthening /stabilization /functional activity           Manual Therapy:    3     mins  70525;  Therapeutic Exercise:    25     mins  69777;     Neuromuscular Phuc:    0    mins  37499;    Therapeutic Activity:     0     mins  63259;       Timed Treatment:   51   mins   Total Treatment:     60   mins    Latoya Celaya, PT  [place] License # [id number]  Physical Therapist

## 2019-10-08 ENCOUNTER — TREATMENT (OUTPATIENT)
Dept: PHYSICAL THERAPY | Facility: CLINIC | Age: 34
End: 2019-10-08

## 2019-10-08 DIAGNOSIS — S46.912S LEFT SHOULDER STRAIN, SEQUELA: ICD-10-CM

## 2019-10-08 DIAGNOSIS — S46.912D STRAIN OF LEFT SHOULDER, SUBSEQUENT ENCOUNTER: Primary | ICD-10-CM

## 2019-10-08 PROCEDURE — 97035 APP MDLTY 1+ULTRASOUND EA 15: CPT | Performed by: PHYSICAL THERAPIST

## 2019-10-08 PROCEDURE — 97033 APP MDLTY 1+IONTPHRSIS EA 15: CPT | Performed by: PHYSICAL THERAPIST

## 2019-10-08 PROCEDURE — 97530 THERAPEUTIC ACTIVITIES: CPT | Performed by: PHYSICAL THERAPIST

## 2019-10-08 NOTE — PROGRESS NOTES
MD Letter    Date of Initial Visit: Type: THERAPY  Noted: 10/2/2019  Today's Date: 10/8/2019  Patient seen for 3 sessions    Treatment has included: therapeutic exercise, manual therapy, ultrasound and iontophoresis    Subjective   Florin states that the shoulder is still quite painful  He reports a deep ache in the superior and lateral aspect of the left upper arm.  He reports frequent popping in the shoulder noting that occasionally the pop is painful.  He denies any sensation of the shoulder locking on him.    Objective - he presents with symmetrical arm swing  Shoulder AROM - Flexion 160*,  Abduction 158*,  ER 80,  IR 80  Strength - Flexion 4+/5*, abduction 4+/5*, extension 5/5, ER 4+/5*, IR 5/5  Sensation - intact  Palpation - tenderness in lateral subacromial space as well in the deltoid tubercle  Special tests - posotive Empty Can, Neers and Patel Brent Impingement tests, weakness noted with Speeds and O'Briens tests but no true pain elicited with these  Activity tolerances - He is able to lift 25# to waist level but 20# was painful with attempt of lifting to the shoulder level    Assessment/Plan  Patient has demonstrated minimal improvement since the initiation of therapy.  The pain has actually increaed over the  Past few days.  The motion has remained functional but is painful at end ranges of motion.  The activity tolerances have remained limited.  I feel that the patient would benefit from further medical intervention.  If you have any questions concerning the care, please do not hesitate to contact me.          PT Signature: Latoya Celaya, PT        Manual Therapy:    0     mins  09693;  Therapeutic Exercise:    5     mins  19162;   Discussed Continuing with [facility]  Neuromuscular Phuc:    0    mins  50308;    Therapeutic Activity:     15     mins  08173;   Assessed for MD    Timed Treatment:   43   mins   Total Treatment:     50   mins

## 2019-10-17 ENCOUNTER — OFFICE VISIT (OUTPATIENT)
Dept: ORTHOPEDIC SURGERY | Facility: CLINIC | Age: 34
End: 2019-10-17

## 2019-10-17 VITALS — WEIGHT: 172.2 LBS | BODY MASS INDEX: 24.65 KG/M2 | TEMPERATURE: 98.7 F | HEIGHT: 70 IN

## 2019-10-17 DIAGNOSIS — S46.012A TRAUMATIC TEAR OF LEFT ROTATOR CUFF, UNSPECIFIED TEAR EXTENT, INITIAL ENCOUNTER: ICD-10-CM

## 2019-10-17 DIAGNOSIS — S43.432A TEAR OF LEFT GLENOID LABRUM, INITIAL ENCOUNTER: ICD-10-CM

## 2019-10-17 DIAGNOSIS — M25.512 LEFT SHOULDER PAIN, UNSPECIFIED CHRONICITY: Primary | ICD-10-CM

## 2019-10-17 PROCEDURE — 99203 OFFICE O/P NEW LOW 30 MIN: CPT | Performed by: ORTHOPAEDIC SURGERY

## 2019-10-17 PROCEDURE — 73030 X-RAY EXAM OF SHOULDER: CPT | Performed by: ORTHOPAEDIC SURGERY

## 2019-10-17 RX ORDER — IBUPROFEN 200 MG
200 TABLET ORAL EVERY 6 HOURS PRN
COMMUNITY
End: 2020-01-10 | Stop reason: ALTCHOICE

## 2019-10-17 NOTE — PROGRESS NOTES
New left Shoulder      Patient: Florin Edmonds        YOB: 1985    Medical Record Number: [id number]        Chief Complaints: left shoulder pain        History of Present Illness: This is a 34-year-old male who presents complaining of left shoulder pain he is right-hand dominant is been ongoing for 3 years states he works at [employer] was lifting a night table is quite heavy up over his head.  He does multiple of these daily there was one event on 9/24/2019 where he was pulling a metal leg with marked increase in his left shoulder.  Symptoms are worsening symptoms are moderate severe intermittent stabbing aching worse with driving walking lifting better with ice he is a  for [employer] past medical history is unremarkable      Allergies: Allergies not on file    Medications:   Home Medications:  No current outpatient medications on file prior to visit.     No current facility-administered medications on file prior to visit.      Current Medications:  Scheduled Meds:  Continuous Infusions:  No current facility-administered medications for this visit.   PRN Meds:.    No past medical history on file.   No past surgical history on file.     Social History     Occupational History   • Not on file   Tobacco Use   • Smoking status: Current Every Day Smoker     Packs/day: 0.25     Types: Electronic Cigarette   Substance and Sexual Activity   • Alcohol use: Yes     Comment: Seldom   • Drug use: Not on file   • Sexual activity: Not on file    Social History     Social History Narrative   • Not on file      No family history on file.          Review of Systems: 14 point review of systems are remarkable for the pertinent positives listed in the chart by the patient the remainder negative    Review of Systems      Physical Exam: 34 y.o. male  General Appearance:    Alert, cooperative, in no acute distress                 There were no vitals filed for this visit.   Patient is alert and read ×3 no  acute distress appears her above-listed at height weight and age.  Affect is normal respiratory rate is normal unlabored. Heart rate regular rate rhythm, sclera, dentition and hearing are normal for the purpose of this exam.    Ortho Exam  Physical exam of the left shoulder reveals no overlying skin changes no lymphedema no lymphadenopathy.  Patient has active flexion 180 with mild symptoms abduction is similar external rotation is to 50 and internal rotation to the upper lumbar spine with mild symptoms.  Patient has good rotator cuff strength 4+ over 5 with isometric strength testing with pain.  Patient has a positive impingement and a positive Patel sign.  Patient has good cervical range of motion which is full and asymptomatic no radicular symptoms.  Patient has a normal elbow exam.  Good distal pulses are presentPatient has pain with overhead activity and a positive Neer sign and a positive empty can sign  They have a positive drop arm any definitive painful arc  He does have some pain with stressing of his biceps anchor    Procedures          Radiology:   AP, Scapular Y and Axillary Lateral of the left shoulder were ordered/reviewed to evauate shoulder pain.  I am no compared to films these are normal I see no evidence of any acute bony pathology  Imaging Results (most recent)     Procedure Component Value Units Date/Time    XR Shoulder 2+ View Left [[id number]] Resulted:  10/17/19 1057     Updated:  10/17/19 1057    Impression:       Ordering physician's impression is located in the Encounter Note dated 10/17/19. X-ray performed in the DR room.          Assessment/Plan: Left shoulder pain is been ongoing for a year much worse recently I am concerned about 2 things #1 rotator cuff but also labral pathology based on his back and of his injury and his job plan is to proceed with an MRI with an arthrogram and him follow-up after that test I will allow him to work with some restrictions are outlined in the letter  for work

## 2019-10-21 ENCOUNTER — TELEPHONE (OUTPATIENT)
Dept: ORTHOPEDIC SURGERY | Facility: CLINIC | Age: 34
End: 2019-10-21

## 2019-11-05 ENCOUNTER — OFFICE VISIT (OUTPATIENT)
Dept: ORTHOPEDIC SURGERY | Facility: CLINIC | Age: 34
End: 2019-11-05

## 2019-11-05 VITALS — WEIGHT: 172 LBS | TEMPERATURE: 98.9 F | HEIGHT: 70 IN | BODY MASS INDEX: 24.62 KG/M2

## 2019-11-05 DIAGNOSIS — IMO0002 BURSITIS/TENDONITIS, SHOULDER: Primary | ICD-10-CM

## 2019-11-05 PROCEDURE — 99213 OFFICE O/P EST LOW 20 MIN: CPT | Performed by: ORTHOPAEDIC SURGERY

## 2019-11-05 NOTE — PROGRESS NOTES
Left Shoulder MRI Follow Up      Patient: Florin Edmodns        YOB: 1985            Chief Complaints: Left Shoulder pain    MRI demonstrates some tendinopathy of the supraspinatus no tear he is still symptomatic he is been on some limited work so that is helped      Physical Exam: 34 y.o. male  General Appearance:    Alert, cooperative, in no acute distress                 There were no vitals filed for this visit.     Patient is alert and read ×3 no acute distress appears her above-listed at height weight and age.  Affect is normal respiratory rate is normal unlabored. Heart rate regular rate rhythm, sclera, dentition and hearing are normal for the purpose of this exam.      Ortho Exam Physical exam of the left shoulder reveals no overlying skin changes no lymphedema no lymphadenopathy.  Patient has active flexion 180 with mild symptoms abduction is similar external rotation is to 50 and internal rotation to the upper lumbar spine with mild symptoms.  Patient has good rotator cuff strength 4+ over 5 with isometric strength testing with pain.  Patient has a positive impingement and a positive Patel sign.  Patient has good cervical range of motion which is full and asymptomatic no radicular symptoms.  Patient has a normal elbow exam.  Good distal pulses are presentPatient has pain with overhead activity and a positive Neer sign and a positive empty can sign  They have a positive drop arm any definitive painful arc        MRI Results: MRIs as above I reviewed the films myself and agree with the findings also reviewed previous x-rays which are essentially normal  Procedures      Assessment/Plan:    Left shoulder pain I think this is more impingement we talked about an injection he wants to hold off on that we will start physical therapy I make an appointment for 3 to 4 weeks if he still has symptoms we will injected still want him on limited duty at work

## 2019-11-08 ENCOUNTER — TELEPHONE (OUTPATIENT)
Dept: ORTHOPEDIC SURGERY | Facility: CLINIC | Age: 34
End: 2019-11-08

## 2019-11-08 NOTE — TELEPHONE ENCOUNTER
LUIS FERNANDO HINDS FROM [facility] CALLED FOR OFFICE NOTES AND WORK STATUS LETTER FAXED TO HER -[phone] AND I HAVE SENT TO HER TODAY/DM

## 2019-11-13 ENCOUNTER — TREATMENT (OUTPATIENT)
Dept: PHYSICAL THERAPY | Facility: CLINIC | Age: 34
End: 2019-11-13

## 2019-11-13 DIAGNOSIS — M75.82 ROTATOR CUFF TENDONITIS, LEFT: Primary | ICD-10-CM

## 2019-11-13 DIAGNOSIS — M75.42 IMPINGEMENT SYNDROME OF LEFT SHOULDER: ICD-10-CM

## 2019-11-13 PROCEDURE — 97530 THERAPEUTIC ACTIVITIES: CPT | Performed by: PHYSICAL THERAPIST

## 2019-11-13 PROCEDURE — 97110 THERAPEUTIC EXERCISES: CPT | Performed by: PHYSICAL THERAPIST

## 2019-11-13 PROCEDURE — 97035 APP MDLTY 1+ULTRASOUND EA 15: CPT | Performed by: PHYSICAL THERAPIST

## 2019-11-13 PROCEDURE — 97164 PT RE-EVAL EST PLAN CARE: CPT | Performed by: PHYSICAL THERAPIST

## 2019-11-13 PROCEDURE — 97033 APP MDLTY 1+IONTPHRSIS EA 15: CPT | Performed by: PHYSICAL THERAPIST

## 2019-11-13 NOTE — PROGRESS NOTES
Physical Therapy Re-Evaluation and Plan of Care    Patient: Florin Edmonds   : 1985  Diagnosis/ICD-10 Code:  Rotator cuff tendonitis, left [M75.82]  Referring practitioner: Katie Yousif MD    Subjective Evaluation    History of Present Illness  Date of onset: 2019    Subjective comment: Saw Dr. Yousif, had an MRI with contrast performed which showed supraspinatus and infraspinatus tendinopathy, but no labral or biceps involvement.  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 Pain  Current pain ratin  At worst pain ratin  Location: Popping and cracking in the shoulder, arm feels tight, still has pain in the superior shoulder down to sargeants patch  Quality: dull ache, throbbing, tight and discomfort  Relieving factors: change in position and rest  Aggravating factors: outstretched reach, overhead activity, prolonged positioning and lifting (arm behind the back)  Progression: improved    Social Support  Lives with: spouse    Diagnostic Tests  X-ray: normal  MRI studies: abnormal    Treatments  Previous treatment: medication and physical therapy  Current treatment: medication and physical therapy  Patient Goals  Patient goals for therapy: increased strength and return to work  Patient goal: Back to normal           Objective       Postural Observations  Seated posture: fair  Standing posture: fair    Additional Postural Observation Details  Protracted shoulders, slightly dropped left shoulder compared to the right    Palpation   Left   No palpable tenderness to the triceps.   Tenderness of the biceps, infraspinatus and supraspinatus.     Tenderness     Left Shoulder   Tenderness in the biceps tendon (proximal), bicipital groove and supraspinatus tendon.     Neurological Testing     Sensation     Shoulder   Left Shoulder   Intact: light  touch    Active Range of Motion   Left Shoulder   Flexion: 163 degrees   Extension: 58 degrees   Abduction: 164 degrees     Strength/Myotome Testing     Left Shoulder     Planes of Motion   Flexion: 4   Extension: 5   Abduction: 4   External rotation at 0°: 4+   Internal rotation at 0°: 5     Tests     Left Shoulder   Positive empty can, Hawkin's and painful arc.   Negative AC shear, drop arm and Speed's.          Assessment & Plan     Assessment  Impairments: abnormal or restricted ROM, activity intolerance, impaired physical strength, lacks appropriate home exercise program and pain with function  Assessment details: 34 y.o. Male with left shoulder tendinosis presents with: 1. Slightly decreased shoulder AROM, 2. Pain with resisted Abduction and External rotation, 3. Positive Impingement signs, 4.  Tenderness to palpation of the supraspinatus, infraspinatus and biceps tendons,  5. Decreased strength of left shoulder, 6. Decreased tolerance for many critical demands of his job as a   Prognosis: good  Functional Limitations: carrying objects, lifting, pulling, pushing, reaching behind back, reaching overhead and unable to perform repetitive tasks  Goals  Plan Goals: Short Term Goals: 2 weeks  Patient will be able to tolerate initial exercises  Patient will have pain <5/10  Patient will be able to reach behind his back with 50% less pain  Patient will be able to lift 20# to waist without pain    Long Term Goals: 4 weeks  Patient will be independent in performing home exercise program.  Patient will have functional pain free shoulder AROM  Patient will be able to lift 50# to waist without increased symptoms  Patient will return to work with min/no restrictions      Plan  Therapy options: will be seen for skilled physical therapy services  Planned modality interventions: iontophoresis and ultrasound  Planned therapy interventions: manual therapy, strengthening, stretching, postural training and  home exercise program  Treatment plan discussed with: patient  Plan details: Patient to continue previously issued HEP        Manual Therapy:    0     mins  12932;  Therapeutic Exercise:    15/25     mins  28384;     Neuromuscular Phuc:    0    mins  55827;    Therapeutic Activity:     10     mins  79285;   Talked about posture, shoulder positioning and benefits of potential steroid injection via MD    Evaluation Time:     20  mins  Timed Treatment:   41   mins   Total Treatment:     75   mins    PT SIGNATURE: Latoya Celaya, PT   DATE TREATMENT INITIATED: 11/13/2019    Initial Certification  Certification Period: 2/11/2020  I certify that the therapy services are furnished while this patient is under my care.  The services outlined above are required by this patient, and will be reviewed every 90 days.     PHYSICIAN: Katie Yousif MD      DATE:     Please sign and return via fax to [phone].. Thank you, [facility].

## 2019-11-14 ENCOUNTER — TREATMENT (OUTPATIENT)
Dept: PHYSICAL THERAPY | Facility: CLINIC | Age: 34
End: 2019-11-14

## 2019-11-14 DIAGNOSIS — M75.82 ROTATOR CUFF TENDONITIS, LEFT: Primary | ICD-10-CM

## 2019-11-14 DIAGNOSIS — M75.42 IMPINGEMENT SYNDROME OF LEFT SHOULDER: ICD-10-CM

## 2019-11-14 PROCEDURE — 97110 THERAPEUTIC EXERCISES: CPT | Performed by: PHYSICAL THERAPIST

## 2019-11-14 PROCEDURE — 97033 APP MDLTY 1+IONTPHRSIS EA 15: CPT | Performed by: PHYSICAL THERAPIST

## 2019-11-14 PROCEDURE — 97035 APP MDLTY 1+ULTRASOUND EA 15: CPT | Performed by: PHYSICAL THERAPIST

## 2019-11-14 NOTE — PROGRESS NOTES
Physical Therapy Daily Progress Note    VISIT#: 5    Subjective   Florin Edmonds reports: that he still has some trouble with resisted ER.  Notes that his pain localizes to the superior aspect of the joint but radiates into the lateral deltoid region.      Objective   Tenderness in the lateral SA space    See Exercise, Manual, and Modality Logs for complete treatment.     Patient Education: Posture    Assessment/Plan  Florin has good smooth movement into flexion but continues to demonstrate some impingement with IR and abduction.     Progress strengthening /stabilization /functional activity           Manual Therapy:    0   mins  58459;  Therapeutic Exercise:    25/35     mins  15852;     Neuromuscular Phuc:    0    mins  70837;    Therapeutic Activity:     0     mins  01470;       Timed Treatment:   41   mins   Total Treatment:     60   mins    Latoya Celaya, PT  [place] License # [id number]  Physical Therapist

## 2019-11-18 ENCOUNTER — TREATMENT (OUTPATIENT)
Dept: PHYSICAL THERAPY | Facility: CLINIC | Age: 34
End: 2019-11-18

## 2019-11-18 DIAGNOSIS — M75.42 IMPINGEMENT SYNDROME OF LEFT SHOULDER: ICD-10-CM

## 2019-11-18 DIAGNOSIS — M75.82 ROTATOR CUFF TENDONITIS, LEFT: Primary | ICD-10-CM

## 2019-11-18 PROCEDURE — 97110 THERAPEUTIC EXERCISES: CPT | Performed by: PHYSICAL THERAPIST

## 2019-11-18 PROCEDURE — 97033 APP MDLTY 1+IONTPHRSIS EA 15: CPT | Performed by: PHYSICAL THERAPIST

## 2019-11-18 PROCEDURE — 97140 MANUAL THERAPY 1/> REGIONS: CPT | Performed by: PHYSICAL THERAPIST

## 2019-11-18 NOTE — PROGRESS NOTES
Physical Therapy Daily Progress Note    VISIT#: 6    Subjective   Florin Edmonds reports: that his shoulder is feeling better as his pain has decreased.  He states that he has rested it all weekend.  States that he did have a few occasions of sharp pain in the shoulder when he clapped forcefully while watching ball games.        Objective   Full active flexion and ER but has slightly limited IR with pain in lateral shoulder radiating into the deltoid tubercle area.    See Exercise, Manual, and Modality Logs for complete treatment.     Patient Education:    Assessment/Plan  Doing very well with his rehab but still having some pain with IR.   Postural awareness improving    Progress strengthening /stabilization /functional activity           Manual Therapy:    10     mins  66626;  Therapeutic Exercise:    25/35     mins  39316;     Neuromuscular Phuc:    0    mins  22804;    Therapeutic Activity:     0     mins  41447;       Timed Treatment:   51   mins   Total Treatment:     70   mins    Latoya Celaya, PT  [place] License # [id number]  Physical Therapist

## 2019-11-25 ENCOUNTER — TREATMENT (OUTPATIENT)
Dept: PHYSICAL THERAPY | Facility: CLINIC | Age: 34
End: 2019-11-25

## 2019-11-25 DIAGNOSIS — M75.42 IMPINGEMENT SYNDROME OF LEFT SHOULDER: ICD-10-CM

## 2019-11-25 DIAGNOSIS — M75.82 ROTATOR CUFF TENDONITIS, LEFT: Primary | ICD-10-CM

## 2019-11-25 PROCEDURE — 97110 THERAPEUTIC EXERCISES: CPT | Performed by: PHYSICAL THERAPIST

## 2019-11-25 PROCEDURE — 97140 MANUAL THERAPY 1/> REGIONS: CPT | Performed by: PHYSICAL THERAPIST

## 2019-11-25 NOTE — PROGRESS NOTES
Physical Therapy Daily Progress Note    VISIT#: 7    Subjective   Florin Edmonds reports: that he has been sick the past week and missed his last 2 appts.  States that his shoulder is feeling better than it had been as he was ill. Only has pain with quick movements.       Objective   Full flexion and ER but still with some limitation in IR    See Exercise, Manual, and Modality Logs for complete treatment.     Patient Education: New sleeper stretch    Assessment/Plan  Much less pain than two weeks ago.  Still has pain at end range IR with Impingement.  Needs to continue to work on posture with exercise.    Progress strengthening /stabilization /functional activity           Manual Therapy:    10     mins  58758;  Therapeutic Exercise:    40/50     mins  32761;     Neuromuscular Phuc:    0    mins  62052;    Therapeutic Activity:     0     mins  70708;       Timed Treatment:   58   mins   Total Treatment:     65   mins    Latoya Celaya, PT  [place] License # [id number]  Physical Therapist

## 2019-12-02 ENCOUNTER — TREATMENT (OUTPATIENT)
Dept: PHYSICAL THERAPY | Facility: CLINIC | Age: 34
End: 2019-12-02

## 2019-12-02 DIAGNOSIS — M75.82 ROTATOR CUFF TENDONITIS, LEFT: Primary | ICD-10-CM

## 2019-12-02 DIAGNOSIS — M75.42 IMPINGEMENT SYNDROME OF LEFT SHOULDER: ICD-10-CM

## 2019-12-02 PROCEDURE — 97110 THERAPEUTIC EXERCISES: CPT | Performed by: PHYSICAL THERAPIST

## 2019-12-02 PROCEDURE — 97140 MANUAL THERAPY 1/> REGIONS: CPT | Performed by: PHYSICAL THERAPIST

## 2019-12-02 PROCEDURE — 97033 APP MDLTY 1+IONTPHRSIS EA 15: CPT | Performed by: PHYSICAL THERAPIST

## 2019-12-02 NOTE — PROGRESS NOTES
Physical Therapy Daily Progress Note    VISIT#: 8    Subjective   Florin Edmonds reports: that he is able to sleep on the left shoulder.  States that he did have some lateral shoulder pain while driving this weekend.        Objective   Able to lift 25# to waist and 20# to shoulder without pain    See Exercise, Manual, and Modality Logs for complete treatment.     Patient Education: Posture    Assessment/Plan  Florin continues to be compliant with therapy. He has essentially full motion but still has pain with elevated lifting.    To MD after next visit           Manual Therapy:    8     mins  62301;  Therapeutic Exercise:    25/35     mins  05911;     Neuromuscular Phuc:    0    mins  54051;    Therapeutic Activity:     5     mins  90413;   Box lifting    Timed Treatment:   46   mins   Total Treatment:     60   mins    Latoya Celaya, PT  [place] License # [id number]  Physical Therapist

## 2019-12-04 ENCOUNTER — TREATMENT (OUTPATIENT)
Dept: PHYSICAL THERAPY | Facility: CLINIC | Age: 34
End: 2019-12-04

## 2019-12-04 DIAGNOSIS — M75.82 ROTATOR CUFF TENDONITIS, LEFT: Primary | ICD-10-CM

## 2019-12-04 DIAGNOSIS — M75.42 IMPINGEMENT SYNDROME OF LEFT SHOULDER: ICD-10-CM

## 2019-12-04 PROCEDURE — 97530 THERAPEUTIC ACTIVITIES: CPT | Performed by: PHYSICAL THERAPIST

## 2019-12-04 PROCEDURE — 97110 THERAPEUTIC EXERCISES: CPT | Performed by: PHYSICAL THERAPIST

## 2019-12-04 PROCEDURE — 97033 APP MDLTY 1+IONTPHRSIS EA 15: CPT | Performed by: PHYSICAL THERAPIST

## 2019-12-04 NOTE — PROGRESS NOTES
MD Letter    Date of Initial Visit: Type: THERAPY  Noted: 10/2/2019  Today's Date: 12/4/2019  Patient seen for 9 sessions    Treatment has included: therapeutic exercise, neuromuscular re-education, manual therapy, ultrasound and iontophoresis    Subjective   Florin cui states that his pain levels have decreased considerably.  He states that he is now able to sleep without pain interruption.  He states that he still has random pain with minimal activity of the shoulder.  He notes that he has not been lifting much as he is not currently working.  He states that he is able to carry groceries up/down steps and use the carpet shampoo machine without pain.  He notes that he has not been lifting furniture as he normally would be.      Objective - he presents with fluid movement patterns but poor posture  Shoulder AROM - full and pain free in all motions  Strength - shoulder flexion, abduction & external rotation slightly limited by pain, internal rotation and extension 5/5  Sensation - intact  Palpation - tenderness in the biceps tendon  Special tests - slight pain with Patel Brent, Speeds, Empty Can and O'Briens tests but no sharp pain noted  Activity tolerances - he is able to lift and carry 70# from floor to waist without problems but had pain with lifting 40# to shoulder level.  He is able to push/pull 100# without increased symptoms.    Assessment/Plan  Patient has demonstrated moderate improvement since the initiation of therapy.  The pain has  decresed in frequency and intensity.  The motion has increased to full.  The activity tolerances have increased but not quite to work demand level.  I feel that the patient would benefit from further medical intervention due to persistent impingement with e levation.  If you have any questions concerning the care, please do not hesitate to contact me.          PT Signature: Latoya Celaya, PT        Manual Therapy:    0     mins  63929;  Therapeutic Exercise:    25/35      mins  48883;     Neuromuscular Phuc:    0    mins  88490;    Therapeutic Activity:     10     mins  35276;   Assessed for MD and work sim tasks    Timed Treatment:   43   mins   Total Treatment:     70   mins

## 2019-12-06 ENCOUNTER — OFFICE VISIT (OUTPATIENT)
Dept: ORTHOPEDIC SURGERY | Facility: CLINIC | Age: 34
End: 2019-12-06

## 2019-12-06 VITALS — HEIGHT: 70 IN | BODY MASS INDEX: 25.43 KG/M2 | TEMPERATURE: 99.6 F | WEIGHT: 177.6 LBS

## 2019-12-06 DIAGNOSIS — IMO0002 BURSITIS/TENDONITIS, SHOULDER: Primary | ICD-10-CM

## 2019-12-06 PROCEDURE — 99212 OFFICE O/P EST SF 10 MIN: CPT | Performed by: ORTHOPAEDIC SURGERY

## 2019-12-06 PROCEDURE — 20610 DRAIN/INJ JOINT/BURSA W/O US: CPT | Performed by: ORTHOPAEDIC SURGERY

## 2019-12-06 RX ORDER — METHYLPREDNISOLONE ACETATE 80 MG/ML
80 INJECTION, SUSPENSION INTRA-ARTICULAR; INTRALESIONAL; INTRAMUSCULAR; SOFT TISSUE
Status: COMPLETED | OUTPATIENT
Start: 2019-12-06 | End: 2019-12-06

## 2019-12-06 RX ADMIN — METHYLPREDNISOLONE ACETATE 80 MG: 80 INJECTION, SUSPENSION INTRA-ARTICULAR; INTRALESIONAL; INTRAMUSCULAR; SOFT TISSUE at 08:25

## 2019-12-06 NOTE — PROGRESS NOTES
Left Shoulder Follow Up      Patient: Florin Edmonds        YOB: 1985            Chief Complaints: left Shoulder pain      History of Present Illness:this patient is here follow-up of left shoulder pain he has an MRI which shows impingement he has been working with physical therapy and has improved however still has some pain we talked again about the injection and I think his therapist has encouraged him to do this and I think it would be a great idea    Physical Exam: 34 y.o. male  General Appearance:    Alert, cooperative, in no acute distress                 There were no vitals filed for this visit.     Patient is alert and read ×3 no acute distress appears her above-listed at height weight and age.  Affect is normal respiratory rate is normal unlabored. Heart rate regular rate rhythm, sclera, dentition and hearing are normal for the purpose of this exam.      Ortho Exam  Physical exam of the left shoulder reveals no overlying skin changes no lymphedema no lymphadenopathy.  Patient has active flexion 180 with mild symptoms abduction is similar external rotation is to 50 and internal rotation to the upper lumbar spine with mild symptoms.  Patient has good rotator cuff strength 4+ over 5 with isometric strength testing with pain.  Patient has a positive impingement and a positive Patel sign.  Patient has good cervical range of motion which is full and asymptomatic no radicular symptoms.  Patient has a normal elbow exam.  Good distal pulses are presentPatient has pain with overhead activity and a positive Neer sign and a positive empty can sign  They have a positive drop arm any definitive painful arc              Assessment/Plan: Left shoulder impingement we talked about options I think it be great to inject this heavy work with physical therapy starting about a week if to let him do work without any lifting in that arm he can return to work otherwise I will see him back in 4 weeks    Large Joint  Arthrocentesis: L subacromial bursa  Date/Time: 12/6/2019 8:25 AM  Consent given by: patient  Site marked: site marked  Timeout: Immediately prior to procedure a time out was called to verify the correct patient, procedure, equipment, support staff and site/side marked as required   Supporting Documentation  Indications: pain   Procedure Details  Location: shoulder - L subacromial bursa  Preparation: Patient was prepped and draped in the usual sterile fashion  Needle size: 22 G  Approach: posterior  Medications administered: 4 mL lidocaine (cardiac); 80 mg methylPREDNISolone acetate 80 MG/ML  Patient tolerance: patient tolerated the procedure well with no immediate complications

## 2019-12-16 ENCOUNTER — TREATMENT (OUTPATIENT)
Dept: PHYSICAL THERAPY | Facility: CLINIC | Age: 34
End: 2019-12-16

## 2019-12-16 DIAGNOSIS — M75.42 IMPINGEMENT SYNDROME OF LEFT SHOULDER: ICD-10-CM

## 2019-12-16 DIAGNOSIS — M75.82 ROTATOR CUFF TENDONITIS, LEFT: Primary | ICD-10-CM

## 2019-12-16 PROCEDURE — 97110 THERAPEUTIC EXERCISES: CPT | Performed by: PHYSICAL THERAPIST

## 2019-12-16 NOTE — PROGRESS NOTES
Physical Therapy Daily Progress Note    VISIT#: 10    Subjective   Florin Edmonds reports: that he received a steroid injection on his last visit.  States that the MD told him that he needed to increase his strength.  Significant soreness the first two days .  He reports that he feels much better now.  No pain with basic activity  But has continued to favor the left arm.      Objective   Essentially full shoulder motion but notes pulling at end range flexion and internal rotation     See Exercise, Manual, and Modality Logs for complete treatment.     Patient Education: New exercises    Assessment/Plan  Patient with good motion but still with some impingement symptoms yet less noted than prior to injection.    Progress strengthening /stabilization /functional activity           Manual Therapy:    0     mins  95617;  Therapeutic Exercise:    50/60     mins  44761;     Neuromuscular Phuc:        mins  27218;    Therapeutic Activity:     0     mins  95755;       Timed Treatment:   50   mins   Total Treatment:     80   mins    Latoya Celaya, PT  [place] License # [id number]  Physical Therapist

## 2019-12-18 ENCOUNTER — TREATMENT (OUTPATIENT)
Dept: PHYSICAL THERAPY | Facility: CLINIC | Age: 34
End: 2019-12-18

## 2019-12-18 DIAGNOSIS — M75.82 ROTATOR CUFF TENDONITIS, LEFT: Primary | ICD-10-CM

## 2019-12-18 DIAGNOSIS — M75.42 IMPINGEMENT SYNDROME OF LEFT SHOULDER: ICD-10-CM

## 2019-12-18 PROCEDURE — 97035 APP MDLTY 1+ULTRASOUND EA 15: CPT | Performed by: PHYSICAL THERAPIST

## 2019-12-18 PROCEDURE — 97110 THERAPEUTIC EXERCISES: CPT | Performed by: PHYSICAL THERAPIST

## 2019-12-18 NOTE — PROGRESS NOTES
Physical Therapy Daily Progress Note    VISIT#: 11    Subjective   Florin Edmonds reports: that he had some throbbing in the shoulder after his last therapy session. States that he had pain the next morning but it resolved later that day. Reports some pain in lateral deltoid with retro motion on the Scifit.    Objective     See Exercise, Manual, and Modality Logs for complete treatment.     Patient Education:    Assessment/Plan  Some soreness and pinching with abduction.  Intermittent sense of impingement.  Compliant with program.    Progress strengthening /stabilization /functional activity           Manual Therapy:    0     mins  61274;  Therapeutic Exercise:    40/50     mins  06024;     Neuromuscular Phuc:    0    mins  98260;    Therapeutic Activity:     0     mins  67790;       Timed Treatment:   48   mins   Total Treatment:     75   mins    Latoya Celaya, PT  [place] License # [id number]  Physical Therapist

## 2019-12-20 ENCOUNTER — TREATMENT (OUTPATIENT)
Dept: PHYSICAL THERAPY | Facility: CLINIC | Age: 34
End: 2019-12-20

## 2019-12-20 DIAGNOSIS — M75.82 ROTATOR CUFF TENDONITIS, LEFT: Primary | ICD-10-CM

## 2019-12-20 DIAGNOSIS — M75.42 IMPINGEMENT SYNDROME OF LEFT SHOULDER: ICD-10-CM

## 2019-12-20 PROCEDURE — 97140 MANUAL THERAPY 1/> REGIONS: CPT | Performed by: PHYSICAL THERAPIST

## 2019-12-20 PROCEDURE — 97110 THERAPEUTIC EXERCISES: CPT | Performed by: PHYSICAL THERAPIST

## 2019-12-20 PROCEDURE — 97035 APP MDLTY 1+ULTRASOUND EA 15: CPT | Performed by: PHYSICAL THERAPIST

## 2019-12-20 NOTE — PROGRESS NOTES
Physical Therapy Daily Progress Note    VISIT#: 12    Subjective   Florin Edmonds reports: that he is a bit sore but no more than he was last visit.  States that he still has popping in the shoulder but the pops are not painful.      Objective   Palpable click with mid range elevation but no pain with the click  Tenderness in biceps>supraspinatus tendons    See Exercise, Manual, and Modality Logs for complete treatment.     Patient Education: new caudal glide stretch    Assessment/Plan  Full motion but has some soreness in anterior shoulder. Slight impingement persists.  Caudal glide stretch may help.    Progress strengthening /stabilization /functional activity           Manual Therapy:    10     mins  63026;  Therapeutic Exercise:    25/35     mins  98518;     Neuromuscular Phuc:    0    mins  72091;    Therapeutic Activity:     0     mins  88074;       Timed Treatment:   43   mins   Total Treatment:     70   mins    Latoya Celaya, PT  [place] License # [id number]  Physical Therapist

## 2019-12-23 ENCOUNTER — TREATMENT (OUTPATIENT)
Dept: PHYSICAL THERAPY | Facility: CLINIC | Age: 34
End: 2019-12-23

## 2019-12-23 DIAGNOSIS — M75.42 IMPINGEMENT SYNDROME OF LEFT SHOULDER: ICD-10-CM

## 2019-12-23 DIAGNOSIS — M75.82 ROTATOR CUFF TENDONITIS, LEFT: Primary | ICD-10-CM

## 2019-12-23 PROCEDURE — 97110 THERAPEUTIC EXERCISES: CPT | Performed by: PHYSICAL THERAPIST

## 2019-12-23 PROCEDURE — 97140 MANUAL THERAPY 1/> REGIONS: CPT | Performed by: PHYSICAL THERAPIST

## 2019-12-23 NOTE — PROGRESS NOTES
Physical Therapy Daily Progress Note    VISIT#: 13    Subjective   Florin Edmonds reports: that he only felt pain in the shoulder a couple of times yesterday but also notes that he did not do much this weekend.      Objective   Slight pinch with end range internal rotation    See Exercise, Manual, and Modality Logs for complete treatment.     Patient Education:    Assessment/Plan  Minimal impingement today as he was able to perform all exercises without pain.    Progress strengthening /stabilization /functional activity           Manual Therapy:    10     mins  38053;  Therapeutic Exercise:    40/50     mins  37078;     Neuromuscular Phuc:    0    mins  33697;    Therapeutic Activity:     0     mins  81621;       Timed Treatment:   50   mins   Total Treatment:     70   mins    Latoya Celaya, PT  [place] License # [id number]  Physical Therapist

## 2019-12-26 ENCOUNTER — TREATMENT (OUTPATIENT)
Dept: PHYSICAL THERAPY | Facility: CLINIC | Age: 34
End: 2019-12-26

## 2019-12-26 DIAGNOSIS — M75.42 IMPINGEMENT SYNDROME OF LEFT SHOULDER: ICD-10-CM

## 2019-12-26 DIAGNOSIS — M75.82 ROTATOR CUFF TENDONITIS, LEFT: Primary | ICD-10-CM

## 2019-12-26 PROCEDURE — 97110 THERAPEUTIC EXERCISES: CPT | Performed by: PHYSICAL THERAPIST

## 2019-12-26 NOTE — PROGRESS NOTES
Physical Therapy Daily Progress Note    VISIT#: 14    Subjective   Florin Edmonds reports: no constant pinching with still notes it with stretching.  Is able to sleep on his side without pain.       Objective   Protracted shoulder posture    Tenderness in biceps tendon    See Exercise, Manual, and Modality Logs for complete treatment.     Patient Education:POSTURE!!  Explained potential surgical options if still having symptoms    Assessment/Plan  Strength is increasing as he has much less fatigue with exercise but still has some impingement with abduction and internal rotation combination.     Progress strengthening /stabilization /functional activity           Manual Therapy:    0     mins  99935;  Therapeutic Exercise:    40/65     mins  00326;     Neuromuscular Phuc:    0    mins  21428;    Therapeutic Activity:     0     mins  04531;       Timed Treatment:   40   mins   Total Treatment:     70   mins    Latoya Celaya, PT  [place] License # [id number]  Physical Therapist

## 2019-12-30 ENCOUNTER — TREATMENT (OUTPATIENT)
Dept: PHYSICAL THERAPY | Facility: CLINIC | Age: 34
End: 2019-12-30

## 2019-12-30 DIAGNOSIS — M75.82 ROTATOR CUFF TENDONITIS, LEFT: Primary | ICD-10-CM

## 2019-12-30 DIAGNOSIS — M75.42 IMPINGEMENT SYNDROME OF LEFT SHOULDER: ICD-10-CM

## 2019-12-30 DIAGNOSIS — S46.912D STRAIN OF LEFT SHOULDER, SUBSEQUENT ENCOUNTER: ICD-10-CM

## 2019-12-30 PROCEDURE — 97140 MANUAL THERAPY 1/> REGIONS: CPT | Performed by: PHYSICAL THERAPIST

## 2019-12-30 PROCEDURE — 97110 THERAPEUTIC EXERCISES: CPT | Performed by: PHYSICAL THERAPIST

## 2019-12-30 NOTE — PROGRESS NOTES
Physical Therapy Daily Progress Note    VISIT#: 15    Subjective   Florin Edmonds reports: that he had quite a bit of pain Friday night when trying to sleep but is back to his usual soreness now.  He states that he had some pain with attempted dip exercise and thinks that is what caused the increase in pain that night.       Objective   Tenderness in biceps tendon    See Exercise, Manual, and Modality Logs for complete treatment.     Patient Education:Posture    Assessment/Plan  Less pain today with activity.  Still with some irritation in the biceps and SS tendons.    Progress strengthening /stabilization /functional activity           Manual Therapy:    8     mins  49966;  Therapeutic Exercise:    40/60     mins  36590;     Neuromuscular Phuc:    0    mins  30480;    Therapeutic Activity:     0     mins  49456;       Timed Treatment:   48   mins   Total Treatment:     70   mins    Latoya Celaya, PT  [place] License # [id number]  Physical Therapist

## 2019-12-31 ENCOUNTER — TREATMENT (OUTPATIENT)
Dept: PHYSICAL THERAPY | Facility: CLINIC | Age: 34
End: 2019-12-31

## 2019-12-31 DIAGNOSIS — M75.42 IMPINGEMENT SYNDROME OF LEFT SHOULDER: ICD-10-CM

## 2019-12-31 DIAGNOSIS — M75.82 ROTATOR CUFF TENDONITIS, LEFT: Primary | ICD-10-CM

## 2019-12-31 PROCEDURE — 97140 MANUAL THERAPY 1/> REGIONS: CPT | Performed by: PHYSICAL THERAPIST

## 2019-12-31 PROCEDURE — 97110 THERAPEUTIC EXERCISES: CPT | Performed by: PHYSICAL THERAPIST

## 2019-12-31 NOTE — PROGRESS NOTES
Physical Therapy Daily Progress Note    VISIT#: 16    Subjective   Florin Edmonds reports: that he still has pain when taking his sweatshirt over his head but not nearly as sharp as it used to be.      Objective   Full pain free motion in all planes except for IR which is painful at mid range    See Exercise, Manual, and Modality Logs for complete treatment.     Patient Education: Posture    Assessment/Plan  Florin continues to demonstrate increased strength and decreased pain yet still has some pain with IR.  Will start lifting from floor next visit    Progress strengthening /stabilization /functional activity           Manual Therapy:    10     mins  67957;  Therapeutic Exercise:    40/70     mins  04766;     Neuromuscular Phuc:    0    mins  87920;    Therapeutic Activity:     0     mins  09475;       Timed Treatment:   50   mins   Total Treatment:     80   mins    Latoya Celaya, PT  [place] License # [id number]  Physical Therapist

## 2020-01-02 ENCOUNTER — TREATMENT (OUTPATIENT)
Dept: PHYSICAL THERAPY | Facility: CLINIC | Age: 35
End: 2020-01-02

## 2020-01-02 DIAGNOSIS — M75.82 ROTATOR CUFF TENDONITIS, LEFT: Primary | ICD-10-CM

## 2020-01-02 DIAGNOSIS — M75.42 IMPINGEMENT SYNDROME OF LEFT SHOULDER: ICD-10-CM

## 2020-01-02 PROCEDURE — 97110 THERAPEUTIC EXERCISES: CPT | Performed by: PHYSICAL THERAPIST

## 2020-01-02 PROCEDURE — 97140 MANUAL THERAPY 1/> REGIONS: CPT | Performed by: PHYSICAL THERAPIST

## 2020-01-02 NOTE — PROGRESS NOTES
Physical Therapy Daily Progress Note    VISIT#: 17    Subjective   Florin Edmonds reports: that he still has some pain in the shoulder with stretching.  Noticed a slight pinch in the shoulder with abduction/internal rotation combination.         Objective   Positive Patel Brent Impingement test    See Exercise, Manual, and Modality Logs for complete treatment.     Patient Education:    Assessment/Plan  Stronger with scapular stabilization muscles yet still impinges with certain position.  Very compliant with therapy.      Progress strengthening /stabilization /functional activity           Manual Therapy:    8     mins  24710;  Therapeutic Exercise:    40/60     mins  12265;     Neuromuscular Phuc:    0    mins  28157;    Therapeutic Activity:     5     mins  99289;   Work sim    Timed Treatment:   53   mins   Total Treatment:     75   mins    Latoya Celaya, PT  [place] License # [id number]  Physical Therapist

## 2020-01-06 ENCOUNTER — TREATMENT (OUTPATIENT)
Dept: PHYSICAL THERAPY | Facility: CLINIC | Age: 35
End: 2020-01-06

## 2020-01-06 DIAGNOSIS — M75.42 IMPINGEMENT SYNDROME OF LEFT SHOULDER: ICD-10-CM

## 2020-01-06 DIAGNOSIS — M75.82 ROTATOR CUFF TENDONITIS, LEFT: Primary | ICD-10-CM

## 2020-01-06 PROCEDURE — 97110 THERAPEUTIC EXERCISES: CPT | Performed by: PHYSICAL THERAPIST

## 2020-01-06 NOTE — PROGRESS NOTES
Physical Therapy Daily Progress Note    VISIT#: 18    Subjective   Florin Edmonds reports: that his shoulder felt pretty good this weekend as he did not do much heavy activity.      Objective   Pinch type sensation with scaption exercise so DC'd it today    See Exercise, Manual, and Modality Logs for complete treatment.     Patient Education:    Assessment/Plan  Strength of shoulder increasing as he was able to increase resistance with many exercises today.  Still with some impingement noted with elevation.    Progress strengthening /stabilization /functional activity           Manual Therapy:    0     mins  75766;  Therapeutic Exercise:    40/65     mins  64300;     Neuromuscular Phuc:    0    mins  97563;    Therapeutic Activity:     0     mins  05304;       Timed Treatment:   40   mins   Total Treatment:     70   mins    Latoya Celaya, PT  [place] License # [id number]  Physical Therapist

## 2020-01-08 ENCOUNTER — TREATMENT (OUTPATIENT)
Dept: PHYSICAL THERAPY | Facility: CLINIC | Age: 35
End: 2020-01-08

## 2020-01-08 DIAGNOSIS — M75.42 IMPINGEMENT SYNDROME OF LEFT SHOULDER: ICD-10-CM

## 2020-01-08 DIAGNOSIS — M75.82 ROTATOR CUFF TENDONITIS, LEFT: Primary | ICD-10-CM

## 2020-01-08 PROCEDURE — 97530 THERAPEUTIC ACTIVITIES: CPT | Performed by: PHYSICAL THERAPIST

## 2020-01-08 PROCEDURE — 97110 THERAPEUTIC EXERCISES: CPT | Performed by: PHYSICAL THERAPIST

## 2020-01-08 NOTE — PROGRESS NOTES
MD Letter - Reassessment    Date of Initial Visit: Type: THERAPY  Noted: 10/2/2019  Today's Date: 1/8/2020  Patient seen for 19 sessions    Treatment has included: therapeutic exercise, neuromuscular re-education, manual therapy and cryotherapy    Subjective   Florin states that he got very good relief from the injection for the first couple of weeks but now notes that he still having a pinching sensation with abduction and prolonged positioning into internal rotation.  He sates that he is concerned that he is still not ready to return to his regular job duties as a .    Objective - he presents with protracted shoulder positioning unless reminded  Shoulder AROM - full in all planes of motion but has pain at end range internal rotation  Strength - functional in all planes of motion but has pian inhibition with flexion and abduction   Sensation - intact  Palpation - tenderness to palpation of the biceps tendon and the anterior subacromial space.  He does have occasional popping in the shoulder but no sense of instability or locking up  Special tests - Positive Patel Brent and Empty Can tests  Activity tolerances - he is able to lift and carry 70# from floor to waist but is only able to lift 40# to chest level without pain.  He is able to push/pull 100# without difficulty.    Assessment/Plan  Patient has demonstrated moderate improvement since the initiation of therapy.  The pain has  Decreased in frequency but is still present with abduction type positioning or overhead activity with weight.  The motion has returned to full.  The activity tolerances have increased but not to work demand level as a .  Do to the persistence of symptoms with elevation, I feel that the patient would benefit from further medical intervention.  If you have any questions concerning the care, please do not hesitate to contact me.          PT Signature: Latoya Celaya, PT        Therapeutic  Exercise:    40/60     mins  11878;     Neuromuscular Phuc:    0    mins  25057;    Therapeutic Activity:     15     mins  76599;   Assessed for MD and work sim tasks    Timed Treatment:   55   mins   Total Treatment:     75   mins

## 2020-01-10 ENCOUNTER — OFFICE VISIT (OUTPATIENT)
Dept: ORTHOPEDIC SURGERY | Facility: CLINIC | Age: 35
End: 2020-01-10

## 2020-01-10 VITALS — BODY MASS INDEX: 25.28 KG/M2 | HEIGHT: 70 IN | TEMPERATURE: 98.6 F | WEIGHT: 176.6 LBS

## 2020-01-10 DIAGNOSIS — M75.42 IMPINGEMENT SYNDROME OF LEFT SHOULDER: Primary | ICD-10-CM

## 2020-01-10 PROCEDURE — 99213 OFFICE O/P EST LOW 20 MIN: CPT | Performed by: ORTHOPAEDIC SURGERY

## 2020-01-10 RX ORDER — MELOXICAM 15 MG/1
TABLET ORAL
Qty: 30 TABLET | Refills: 0 | Status: SHIPPED | OUTPATIENT
Start: 2020-01-10 | End: 2020-01-10 | Stop reason: ALTCHOICE

## 2020-01-10 RX ORDER — CEFAZOLIN SODIUM 2 G/100ML
2 INJECTION, SOLUTION INTRAVENOUS ONCE
Status: CANCELLED | OUTPATIENT
Start: 2020-01-28 | End: 2020-01-10

## 2020-01-10 NOTE — PROGRESS NOTES
Left Shoulder Follow Up      Patient: Florin Edmonds        YOB: 1985            Chief Complaints: left Shoulder pain      History of Present Illness: Patient is here follow-up of left shoulder injury.  We have done everything conservative he still has activity limiting pain.  He has an MRI which shows some tendinopathy of the rotator cuff no obvious tear.  We have injected him he is done physical therapy anti-inflammatories rest strengthening stretching all with no lasting improvement.  He is been very diligent with his program and is done everything we asked him to do.  Despite this, he has persistent pain and is unable to return to work.  In view of this it would be my recommendation that we proceed with arthroscopy and evaluation.  At the time of surgery it could be a decompression and debridement, but also obviously evaluate the labrum biceps tendon and rotator cuff if pathology is found on any of those that we do not see on MRI would proceed with addressing those issues as well      Physical Exam: 34 y.o. male  General Appearance:    Alert, cooperative, in no acute distress                 There were no vitals filed for this visit.     Patient is alert and read ×3 no acute distress appears her above-listed at height weight and age.  Affect is normal respiratory rate is normal unlabored. Heart rate regular rate rhythm, sclera, dentition and hearing are normal for the purpose of this exam.      Ortho Exam  Physical exam of the left shoulder reveals no overlying skin changes no lymphedema no lymphadenopathy.  Patient has active flexion 180 with mild symptoms abduction is similar external rotation is to 50 and internal rotation to the upper lumbar spine with mild symptoms.  Patient has good rotator cuff strength 4+ over 5 with isometric strength testing with pain.  Patient has a positive impingement and a positive Patel sign.  Patient has good cervical range of motion which is full and asymptomatic no  "radicular symptoms.  Patient has a normal elbow exam.  Good distal pulses are presentPatient has pain with overhead activity and a positive Neer sign and a positive empty can sign  They have a positive drop arm any definitive painful arc    Physical Exam: 34 y.o. male  General Appearance:    Alert, cooperative, in no acute distress                      Vitals:    01/10/20 0813   Temp: 98.6 °F (37 °C)   Weight: 80.1 kg (176 lb 9.6 oz)   Height: 177.8 cm (70\")        Head:    Normocephalic, without obvious abnormality, atraumatic   Eyes:            conjunctivae and sclerae normal, no pallor, corneas clear,    Ears:    Ears appear intact with no abnormalities noted   Throat:   No oral lesions, no thrush, oral mucosa moist   Neck:   No adenopathy, supple, trachea midline, no thyromegaly,    Back:     No kyphosis present, no scoliosis present, no skin lesions,      erythema or scars, no tenderness to percussion or                   palpation,   range of motion normal   Lungs:     Clear to auscultation,respirations regular, even and                  unlabored    Heart:    Regular rhythm and normal rate               Chest Wall:    No abnormalities observed               Pulses:   Pulses palpable and equal bilaterally   Skin:   No bleeding, bruising or rash   Lymph nodes:   No palpable adenopathy   Neurologic:   Appears neurologic intact                 Assessment/Plan:      left shoulder injury.  We have done everything conservative he still has activity limiting pain.  He has an MRI which shows some tendinopathy of the rotator cuff no obvious tear.  We have injected him he is done physical therapy anti-inflammatories rest strengthening stretching all with no lasting improvement.  He is been very diligent with his program and is done everything we asked him to do.  Despite this, he has persistent pain and is unable to return to work.  In view of this it would be my recommendation that we proceed with arthroscopy and " evaluation.  At the time of surgery it could be a decompression and debridement, but also obviously evaluate the labrum biceps tendon and rotator cuff if pathology is found on any of those that we do not see on MRI would proceed with addressing those issues as well    We did discuss in detail risk benefits and alternatives The patient voiced understanding of the risks, benefits, and alternative forms of treatment that were discussed and the patient consents to proceed with the above listed surgery.  All risks, benefits and alternatives were discussed.  Risks including to but not exclusive to anesthetic complications, including death, MI, CVA, infection, bleeding DVT, fracture, residual pain and need for future surgery.  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

## 2020-01-17 ENCOUNTER — TELEPHONE (OUTPATIENT)
Dept: ORTHOPEDIC SURGERY | Facility: CLINIC | Age: 35
End: 2020-01-17

## 2020-01-21 NOTE — TELEPHONE ENCOUNTER
Will that is perfect because we do not want more physical therapy appointments we want to proceed with surgery

## 2020-01-23 PROBLEM — M75.42 IMPINGEMENT SYNDROME OF LEFT SHOULDER: Status: ACTIVE | Noted: 2020-01-23

## 2020-01-27 ENCOUNTER — APPOINTMENT (OUTPATIENT)
Dept: PREADMISSION TESTING | Facility: HOSPITAL | Age: 35
End: 2020-01-27

## 2020-01-27 VITALS
DIASTOLIC BLOOD PRESSURE: 72 MMHG | OXYGEN SATURATION: 98 % | RESPIRATION RATE: 16 BRPM | HEART RATE: 109 BPM | WEIGHT: 176.4 LBS | HEIGHT: 70 IN | TEMPERATURE: 98 F | SYSTOLIC BLOOD PRESSURE: 138 MMHG | BODY MASS INDEX: 25.25 KG/M2

## 2020-01-27 LAB
DEPRECATED RDW RBC AUTO: 41.3 FL (ref 37–54)
ERYTHROCYTE [DISTWIDTH] IN BLOOD BY AUTOMATED COUNT: 12.8 % (ref 12.3–15.4)
HCT VFR BLD AUTO: 48.3 % (ref 37.5–51)
HGB BLD-MCNC: 16.9 G/DL (ref 13–17.7)
MCH RBC QN AUTO: 31.1 PG (ref 26.6–33)
MCHC RBC AUTO-ENTMCNC: 35 G/DL (ref 31.5–35.7)
MCV RBC AUTO: 89 FL (ref 79–97)
PLATELET # BLD AUTO: 150 10*3/MM3 (ref 140–450)
PMV BLD AUTO: 12.3 FL (ref 6–12)
RBC # BLD AUTO: 5.43 10*6/MM3 (ref 4.14–5.8)
WBC NRBC COR # BLD: 6.51 10*3/MM3 (ref 3.4–10.8)

## 2020-01-27 PROCEDURE — 85027 COMPLETE CBC AUTOMATED: CPT | Performed by: ORTHOPAEDIC SURGERY

## 2020-01-27 PROCEDURE — 36415 COLL VENOUS BLD VENIPUNCTURE: CPT

## 2020-01-27 RX ORDER — IBUPROFEN 800 MG/1
800 TABLET ORAL DAILY
COMMUNITY
End: 2020-05-29

## 2020-01-27 NOTE — DISCHARGE INSTRUCTIONS
Take the following medications the morning of surgery: NO MEDICATIONS    SURGERY 01/28 PATIENT TO CALL OFFICE TO CLARIFY ARRIVAL TIME    General Instructions:  • Do not eat solid food after midnight the night before surgery.  • You may drink clear liquids day of surgery but must stop at least one hour before your hospital arrival time.  • It is beneficial for you to have a clear drink that contains carbohydrates the day of surgery.  We suggest a 12 to 20 ounce bottle of Gatorade or Powerade for non-diabetic patients or a 12 to 20 ounce bottle of G2 or Powerade Zero for diabetic patients. (Pediatric patients, are not advised to drink a 12 to 20 ounce carbohydrate drink)    Clear liquids are liquids you can see through.  Nothing red in color.     Plain water                               Sports drinks  Sodas                                   Gelatin (Jell-O)  Fruit juices without pulp such as white grape juice and apple juice  Popsicles that contain no fruit or yogurt  Tea or coffee (no cream or milk added)  Gatorade / Powerade  G2 / Powerade Zero    • Infants may have breast milk up to four hours before surgery.  • Infants drinking formula may drink formula up to six hours before surgery.   • Patients who avoid smoking, chewing tobacco and alcohol for 4 weeks prior to surgery have a reduced risk of post-operative complications.  Quit smoking as many days before surgery as you can.  • Do not smoke, use chewing tobacco or drink alcohol the day of surgery.   • If applicable bring your C-PAP/ BI-PAP machine.  • Bring any papers given to you in the doctor’s office.  • Wear clean comfortable clothes.  • Do not wear contact lenses, false eyelashes or make-up.  Bring a case for your glasses.   • Bring crutches or walker if applicable.  • Remove all piercings.  Leave jewelry and any other valuables at home.  • Hair extensions with metal clips must be removed prior to surgery.  • The Pre-Admission Testing nurse will instruct  you to bring medications if unable to obtain an accurate list in Pre-Admission Testing.        If you were given a blood bank ID arm band remember to bring it with you the day of surgery.    Preventing a Surgical Site Infection:  • For 2 to 3 days before surgery, avoid shaving with a razor because the razor can irritate skin and make it easier to develop an infection.    • Any areas of open skin can increase the risk of a post-operative wound infection by allowing bacteria to enter and travel throughout the body.  Notify your surgeon if you have any skin wounds / rashes even if it is not near the expected surgical site.  The area will need assessed to determine if surgery should be delayed until it is healed.  • The night prior to surgery sleep in a clean bed with clean clothing.  Do not allow pets to sleep with you.  • Shower on the morning of surgery using a fresh bar of anti-bacterial soap (such as Dial) and clean washcloth.  Dry with a clean towel and dress in clean clothing.  • Ask your surgeon if you will be receiving antibiotics prior to surgery.  • Make sure you, your family, and all healthcare providers clean their hands with soap and water or an alcohol based hand  before caring for you or your wound.    Day of surgery:  Your arrival time is approximately two hours before your scheduled surgery time.  Upon arrival, a Pre-op nurse and Anesthesiologist will review your health history, obtain vital signs, and answer questions you may have.  The only belongings needed at this time will be a list of your home medications and if applicable your C-PAP/BI-PAP machine.  If you are staying overnight your family can leave the rest of your belongings in the car and bring them to your room later.  A Pre-op nurse will start an IV and you may receive medication in preparation for surgery, including something to help you relax.  Your family will be able to see you in the Pre-op area.  Two visitors at a time will  be allowed in the Pre-op room.  While you are in surgery your family should notify the waiting room  if they leave the waiting room area and provide a contact phone number.    Please be aware that surgery does come with discomfort.  We want to make every effort to control your discomfort so please discuss any uncontrolled symptoms with your nurse.   Your doctor will most likely have prescribed pain medications.      If you are going home after surgery you will receive individualized written care instructions before being discharged.  A responsible adult must drive you to and from the hospital on the day of your surgery and stay with you for 24 hours.    If you are staying overnight following surgery, you will be transported to your hospital room following the recovery period.  [facility] has all private rooms.    If you have any questions please call Pre-Admission Testing at [phone].  Deductibles and co-payments are collected on the day of service. Please be prepared to pay the required co-pay, deductible or deposit on the day of service as defined by your plan.Take the following medications the morning of surgery:        General Instructions:  • Do not eat solid food after midnight the night before surgery.  • You may drink clear liquids day of surgery but must stop at least one hour before your hospital arrival time.  • It is beneficial for you to have a clear drink that contains carbohydrates the day of surgery.  We suggest a 12 to 20 ounce bottle of Gatorade or Powerade for non-diabetic patients or a 12 to 20 ounce bottle of G2 or Powerade Zero for diabetic patients. (Pediatric patients, are not advised to drink a 12 to 20 ounce carbohydrate drink)    Clear liquids are liquids you can see through.  Nothing red in color.     Plain water                               Sports drinks  Sodas                                   Gelatin (Jell-O)  Fruit juices without pulp such as white  grape juice and apple juice  Popsicles that contain no fruit or yogurt  Tea or coffee (no cream or milk added)  Gatorade / Powerade  G2 / Powerade Zero    • Infants may have breast milk up to four hours before surgery.  • Infants drinking formula may drink formula up to six hours before surgery.   • Patients who avoid smoking, chewing tobacco and alcohol for 4 weeks prior to surgery have a reduced risk of post-operative complications.  Quit smoking as many days before surgery as you can.  • Do not smoke, use chewing tobacco or drink alcohol the day of surgery.   • If applicable bring your C-PAP/ BI-PAP machine.  • Bring any papers given to you in the doctor’s office.  • Wear clean comfortable clothes.  • Do not wear contact lenses, false eyelashes or make-up.  Bring a case for your glasses.   • Bring crutches or walker if applicable.  • Remove all piercings.  Leave jewelry and any other valuables at home.  • Hair extensions with metal clips must be removed prior to surgery.  • The Pre-Admission Testing nurse will instruct you to bring medications if unable to obtain an accurate list in Pre-Admission Testing.        If you were given a blood bank ID arm band remember to bring it with you the day of surgery.    Preventing a Surgical Site Infection:  • For 2 to 3 days before surgery, avoid shaving with a razor because the razor can irritate skin and make it easier to develop an infection.    • Any areas of open skin can increase the risk of a post-operative wound infection by allowing bacteria to enter and travel throughout the body.  Notify your surgeon if you have any skin wounds / rashes even if it is not near the expected surgical site.  The area will need assessed to determine if surgery should be delayed until it is healed.  • The night prior to surgery sleep in a clean bed with clean clothing.  Do not allow pets to sleep with you.  • Shower on the morning of surgery using a fresh bar of anti-bacterial soap (such  as Dial) and clean washcloth.  Dry with a clean towel and dress in clean clothing.  • Ask your surgeon if you will be receiving antibiotics prior to surgery.  • Make sure you, your family, and all healthcare providers clean their hands with soap and water or an alcohol based hand  before caring for you or your wound.    Day of surgery:  Your arrival time is approximately two hours before your scheduled surgery time.  Upon arrival, a Pre-op nurse and Anesthesiologist will review your health history, obtain vital signs, and answer questions you may have.  The only belongings needed at this time will be a list of your home medications and if applicable your C-PAP/BI-PAP machine.  If you are staying overnight your family can leave the rest of your belongings in the car and bring them to your room later.  A Pre-op nurse will start an IV and you may receive medication in preparation for surgery, including something to help you relax.  Your family will be able to see you in the Pre-op area.  Two visitors at a time will be allowed in the Pre-op room.  While you are in surgery your family should notify the waiting room  if they leave the waiting room area and provide a contact phone number.    Please be aware that surgery does come with discomfort.  We want to make every effort to control your discomfort so please discuss any uncontrolled symptoms with your nurse.   Your doctor will most likely have prescribed pain medications.      If you are going home after surgery you will receive individualized written care instructions before being discharged.  A responsible adult must drive you to and from the hospital on the day of your surgery and stay with you for 24 hours.    If you are staying overnight following surgery, you will be transported to your hospital room following the recovery period.  [facility] has all private rooms.    If you have any questions please call Pre-Admission Testing  at [phone].  Deductibles and co-payments are collected on the day of service. Please be prepared to pay the required co-pay, deductible or deposit on the day of service as defined by your plan.

## 2020-01-28 ENCOUNTER — HOSPITAL ENCOUNTER (OUTPATIENT)
Facility: HOSPITAL | Age: 35
Setting detail: HOSPITAL OUTPATIENT SURGERY
Discharge: HOME | End: 2020-01-28
Attending: ORTHOPAEDIC SURGERY | Admitting: ORTHOPAEDIC SURGERY

## 2020-01-28 ENCOUNTER — ANESTHESIA (OUTPATIENT)
Dept: PERIOP | Facility: HOSPITAL | Age: 35
End: 2020-01-28

## 2020-01-28 ENCOUNTER — ANESTHESIA EVENT (OUTPATIENT)
Dept: PERIOP | Facility: HOSPITAL | Age: 35
End: 2020-01-28

## 2020-01-28 VITALS
TEMPERATURE: 98.1 F | HEART RATE: 90 BPM | SYSTOLIC BLOOD PRESSURE: 136 MMHG | RESPIRATION RATE: 16 BRPM | DIASTOLIC BLOOD PRESSURE: 84 MMHG | OXYGEN SATURATION: 97 %

## 2020-01-28 DIAGNOSIS — M75.42 IMPINGEMENT SYNDROME OF LEFT SHOULDER: ICD-10-CM

## 2020-01-28 PROCEDURE — G0463 HOSPITAL OUTPT CLINIC VISIT: HCPCS | Performed by: ORTHOPAEDIC SURGERY

## 2020-01-28 RX ORDER — SODIUM CHLORIDE 0.9 % (FLUSH) 0.9 %
3-10 SYRINGE (ML) INJECTION AS NEEDED
Status: DISCONTINUED | OUTPATIENT
Start: 2020-01-28 | End: 2020-01-28 | Stop reason: HOSPADM

## 2020-01-28 RX ORDER — PROMETHAZINE HYDROCHLORIDE 25 MG/ML
6.25 INJECTION, SOLUTION INTRAMUSCULAR; INTRAVENOUS ONCE AS NEEDED
Status: CANCELLED | OUTPATIENT
Start: 2020-01-28

## 2020-01-28 RX ORDER — HYDROCODONE BITARTRATE AND ACETAMINOPHEN 7.5; 325 MG/1; MG/1
1 TABLET ORAL EVERY 4 HOURS PRN
Status: CANCELLED | OUTPATIENT
Start: 2020-01-28 | End: 2020-02-07

## 2020-01-28 RX ORDER — ONDANSETRON 2 MG/ML
4 INJECTION INTRAMUSCULAR; INTRAVENOUS ONCE AS NEEDED
Status: CANCELLED | OUTPATIENT
Start: 2020-01-28

## 2020-01-28 RX ORDER — FAMOTIDINE 10 MG/ML
20 INJECTION, SOLUTION INTRAVENOUS ONCE
Status: DISCONTINUED | OUTPATIENT
Start: 2020-01-28 | End: 2020-01-28 | Stop reason: HOSPADM

## 2020-01-28 RX ORDER — SCOLOPAMINE TRANSDERMAL SYSTEM 1 MG/1
1 PATCH, EXTENDED RELEASE TRANSDERMAL ONCE
Status: DISCONTINUED | OUTPATIENT
Start: 2020-01-28 | End: 2020-01-28 | Stop reason: HOSPADM

## 2020-01-28 RX ORDER — MIDAZOLAM HYDROCHLORIDE 1 MG/ML
2 INJECTION INTRAMUSCULAR; INTRAVENOUS
Status: DISCONTINUED | OUTPATIENT
Start: 2020-01-28 | End: 2020-01-28 | Stop reason: HOSPADM

## 2020-01-28 RX ORDER — CEFAZOLIN SODIUM 2 G/100ML
2 INJECTION, SOLUTION INTRAVENOUS ONCE
Status: DISCONTINUED | OUTPATIENT
Start: 2020-01-28 | End: 2020-01-28 | Stop reason: HOSPADM

## 2020-01-28 RX ORDER — FENTANYL CITRATE 50 UG/ML
50 INJECTION, SOLUTION INTRAMUSCULAR; INTRAVENOUS
Status: DISCONTINUED | OUTPATIENT
Start: 2020-01-28 | End: 2020-01-28 | Stop reason: HOSPADM

## 2020-01-28 RX ORDER — SODIUM CHLORIDE, SODIUM LACTATE, POTASSIUM CHLORIDE, CALCIUM CHLORIDE 600; 310; 30; 20 MG/100ML; MG/100ML; MG/100ML; MG/100ML
9 INJECTION, SOLUTION INTRAVENOUS CONTINUOUS
Status: DISCONTINUED | OUTPATIENT
Start: 2020-01-28 | End: 2020-01-28 | Stop reason: HOSPADM

## 2020-01-28 RX ORDER — PROMETHAZINE HYDROCHLORIDE 25 MG/1
25 SUPPOSITORY RECTAL ONCE AS NEEDED
Status: CANCELLED | OUTPATIENT
Start: 2020-01-28

## 2020-01-28 RX ORDER — PROMETHAZINE HYDROCHLORIDE 25 MG/1
25 TABLET ORAL ONCE AS NEEDED
Status: CANCELLED | OUTPATIENT
Start: 2020-01-28

## 2020-01-28 RX ORDER — SODIUM CHLORIDE 0.9 % (FLUSH) 0.9 %
3 SYRINGE (ML) INJECTION EVERY 12 HOURS SCHEDULED
Status: DISCONTINUED | OUTPATIENT
Start: 2020-01-28 | End: 2020-01-28 | Stop reason: HOSPADM

## 2020-01-28 RX ORDER — ENALAPRILAT 2.5 MG/2ML
0.62 INJECTION INTRAVENOUS ONCE AS NEEDED
Status: CANCELLED | OUTPATIENT
Start: 2020-01-28

## 2020-01-28 RX ORDER — MIDAZOLAM HYDROCHLORIDE 1 MG/ML
1 INJECTION INTRAMUSCULAR; INTRAVENOUS
Status: DISCONTINUED | OUTPATIENT
Start: 2020-01-28 | End: 2020-01-28 | Stop reason: HOSPADM

## 2020-01-28 RX ORDER — HYDROMORPHONE HYDROCHLORIDE 1 MG/ML
0.5 INJECTION, SOLUTION INTRAMUSCULAR; INTRAVENOUS; SUBCUTANEOUS
Status: CANCELLED | OUTPATIENT
Start: 2020-01-28

## 2020-01-28 RX ORDER — FENTANYL CITRATE 50 UG/ML
50 INJECTION, SOLUTION INTRAMUSCULAR; INTRAVENOUS
Status: CANCELLED | OUTPATIENT
Start: 2020-01-28

## 2020-01-28 RX ORDER — LIDOCAINE HYDROCHLORIDE 10 MG/ML
0.5 INJECTION, SOLUTION EPIDURAL; INFILTRATION; INTRACAUDAL; PERINEURAL ONCE AS NEEDED
Status: DISCONTINUED | OUTPATIENT
Start: 2020-01-28 | End: 2020-01-28 | Stop reason: HOSPADM

## 2020-01-28 NOTE — ANESTHESIA PREPROCEDURE EVALUATION
Anesthesia Evaluation     Patient summary reviewed and Nursing notes reviewed   NPO Solid Status: > 8 hours  NPO Liquid Status: > 2 hours           Airway   Mallampati: II  TM distance: >3 FB  Neck ROM: full  Dental - normal exam   (+) upper dentures    Pulmonary - normal exam    breath sounds clear to auscultation  (+) a smoker Current Smoked day of surgery,   Cardiovascular - negative cardio ROS and normal exam    Rhythm: regular  Rate: normal    (-) angina, orthopnea, PND, CHILD      Neuro/Psych- negative ROS  GI/Hepatic/Renal/Endo - negative ROS     Musculoskeletal (-) negative ROS    Abdominal    Substance History - negative use     OB/GYN negative ob/gyn ROS         Other - negative ROS                     Anesthesia Plan    ASA 2     general   (ISB for PO pain)  intravenous induction     Anesthetic plan, all risks, benefits, and alternatives have been provided, discussed and informed consent has been obtained with: patient.

## 2020-02-10 ENCOUNTER — TELEPHONE (OUTPATIENT)
Dept: ORTHOPEDIC SURGERY | Facility: CLINIC | Age: 35
End: 2020-02-10

## 2020-02-10 NOTE — TELEPHONE ENCOUNTER
I do not need to see him just need to know when the rash is resolved he may have to use his own insurance to see the dermatologist

## 2020-02-13 ENCOUNTER — TELEPHONE (OUTPATIENT)
Dept: ORTHOPEDIC SURGERY | Facility: CLINIC | Age: 35
End: 2020-02-13

## 2020-02-25 ENCOUNTER — TELEPHONE (OUTPATIENT)
Dept: ORTHOPEDIC SURGERY | Facility: CLINIC | Age: 35
End: 2020-02-25

## 2020-02-25 NOTE — TELEPHONE ENCOUNTER
LUIS FERNANDO HINDS FROM [facility] CALLED AND WANTED TO KNOW IF WE RECEIVED INFO FROM [facility]ET SO PT COULD HAVE HIS SX AND I ADVISED WE HAD NOT.  SHE SAID PT HAD APPT TODAY AND IS GETTING PRESCRIPTION FILLED AND THEY WOULD BE SENDING DR CARRION LETTER IN NEXT COUPLE OF DAYS AND IF WE HAVE QUESTIONS FOR HER WE CAN CALL HER [phone]

## 2020-05-13 ENCOUNTER — TELEPHONE (OUTPATIENT)
Dept: ORTHOPEDIC SURGERY | Facility: CLINIC | Age: 35
End: 2020-05-13

## 2020-05-14 ENCOUNTER — TELEMEDICINE (OUTPATIENT)
Dept: ORTHOPEDIC SURGERY | Facility: CLINIC | Age: 35
End: 2020-05-14

## 2020-05-14 ENCOUNTER — PREP FOR SURGERY (OUTPATIENT)
Dept: OTHER | Facility: HOSPITAL | Age: 35
End: 2020-05-14

## 2020-05-14 DIAGNOSIS — M75.40 IMPINGEMENT SYNDROME OF SHOULDER: Primary | ICD-10-CM

## 2020-05-14 RX ORDER — CEFAZOLIN SODIUM 2 G/100ML
2 INJECTION, SOLUTION INTRAVENOUS ONCE
Status: CANCELLED | OUTPATIENT
Start: 2020-06-02 | End: 2020-05-14

## 2020-05-14 NOTE — PROGRESS NOTES
Shoulder Follow Up/  Video visit      Patient: Florin Edmonds        YOB: 1985            Chief Complaints: Shoulder pain left      History of Present Illness: Is a video visit follow-up of left shoulder pain he was scheduled to have a shoulder arthroscopy probable decompression but had a large rash over his shoulder.  He did see a dermatologist and states that for the most part is completely resolved he wishes to proceed.  His symptoms are the same he agreed to the video visit      Physical Exam: 34 y.o. male  General Appearance:    Alert, cooperative, in no acute distress                 There were no vitals filed for this visit.     Patient is alert and read ×3 no acute distress appears her above-listed at height weight and age.  Affect is normal respiratory rate is normal unlabored. Heart rate regular rate rhythm, sclera, dentition and hearing are normal for the purpose of this exam.      Ortho Exam      Physical Luis Angel of the shoulder of the rash does appear to be much improved I would feel very comfortable proceeding with the current state of the rash he can active flex to 180 abduction similar he states his strength is good but he does have pain        Assessment/Plan:      Left shoulder pain plan is to proceed with shoulder arthroscopy probable decompression.  We did discuss the fact that I will address whatever I find at the time of surgery we also discussed again risk benefits and alternatives The patient voiced understanding of the risks, benefits, and alternative forms of treatment that were discussed and the patient consents to proceed with the above listed surgery.  All risks, benefits and alternatives were discussed.  Risks including to but not exclusive to anesthetic complications, including death, MI, CVA, infection, bleeding DVT, fracture, residual pain and need for future surgery.  He understands these and agrees to proceed this is work comp total time was 10 minutes

## 2020-05-18 ENCOUNTER — PREP FOR SURGERY (OUTPATIENT)
Dept: OTHER | Facility: HOSPITAL | Age: 35
End: 2020-05-18

## 2020-05-18 DIAGNOSIS — M75.40 IMPINGEMENT SYNDROME OF SHOULDER: Primary | ICD-10-CM

## 2020-05-18 RX ORDER — CEFAZOLIN SODIUM 2 G/100ML
2 INJECTION, SOLUTION INTRAVENOUS ONCE
Status: CANCELLED | OUTPATIENT
Start: 2020-05-18 | End: 2020-05-18

## 2020-05-19 PROBLEM — M75.40 IMPINGEMENT SYNDROME OF SHOULDER: Status: ACTIVE | Noted: 2020-05-19

## 2020-05-28 ENCOUNTER — TRANSCRIBE ORDERS (OUTPATIENT)
Dept: SLEEP MEDICINE | Facility: HOSPITAL | Age: 35
End: 2020-05-28

## 2020-05-28 DIAGNOSIS — Z01.818 OTHER SPECIFIED PRE-OPERATIVE EXAMINATION: Primary | ICD-10-CM

## 2020-05-29 ENCOUNTER — APPOINTMENT (OUTPATIENT)
Dept: PREADMISSION TESTING | Facility: HOSPITAL | Age: 35
End: 2020-05-29

## 2020-05-29 VITALS
RESPIRATION RATE: 16 BRPM | WEIGHT: 180 LBS | HEART RATE: 107 BPM | DIASTOLIC BLOOD PRESSURE: 84 MMHG | TEMPERATURE: 98.1 F | SYSTOLIC BLOOD PRESSURE: 138 MMHG | HEIGHT: 70 IN | OXYGEN SATURATION: 100 % | BODY MASS INDEX: 25.77 KG/M2

## 2020-05-29 LAB
DEPRECATED RDW RBC AUTO: 41.6 FL (ref 37–54)
ERYTHROCYTE [DISTWIDTH] IN BLOOD BY AUTOMATED COUNT: 12.9 % (ref 12.3–15.4)
HCT VFR BLD AUTO: 48.8 % (ref 37.5–51)
HGB BLD-MCNC: 17.4 G/DL (ref 13–17.7)
MCH RBC QN AUTO: 31.5 PG (ref 26.6–33)
MCHC RBC AUTO-ENTMCNC: 35.7 G/DL (ref 31.5–35.7)
MCV RBC AUTO: 88.4 FL (ref 79–97)
PLATELET # BLD AUTO: 136 10*3/MM3 (ref 140–450)
PMV BLD AUTO: 12.9 FL (ref 6–12)
RBC # BLD AUTO: 5.52 10*6/MM3 (ref 4.14–5.8)
WBC NRBC COR # BLD: 7.16 10*3/MM3 (ref 3.4–10.8)

## 2020-05-29 PROCEDURE — 36415 COLL VENOUS BLD VENIPUNCTURE: CPT

## 2020-05-29 PROCEDURE — 85027 COMPLETE CBC AUTOMATED: CPT | Performed by: ORTHOPAEDIC SURGERY

## 2020-05-29 RX ORDER — IBUPROFEN 200 MG
200 TABLET ORAL EVERY 6 HOURS PRN
COMMUNITY

## 2020-05-29 RX ORDER — DOXYCYCLINE 100 MG/1
CAPSULE ORAL
COMMUNITY
Start: 2020-02-26 | End: 2020-05-29

## 2020-05-30 ENCOUNTER — LAB (OUTPATIENT)
Dept: LAB | Facility: HOSPITAL | Age: 35
End: 2020-05-30

## 2020-05-30 DIAGNOSIS — Z01.818 OTHER SPECIFIED PRE-OPERATIVE EXAMINATION: ICD-10-CM

## 2020-05-30 PROCEDURE — U0004 COV-19 TEST NON-CDC HGH THRU: HCPCS

## 2020-06-01 LAB
REF LAB TEST METHOD: NORMAL
SARS-COV-2 RNA RESP QL NAA+PROBE: NOT DETECTED

## 2020-06-03 ENCOUNTER — ANESTHESIA (OUTPATIENT)
Dept: PERIOP | Facility: HOSPITAL | Age: 35
End: 2020-06-03

## 2020-06-03 ENCOUNTER — ANESTHESIA EVENT (OUTPATIENT)
Dept: PERIOP | Facility: HOSPITAL | Age: 35
End: 2020-06-03

## 2020-06-03 ENCOUNTER — HOSPITAL ENCOUNTER (OUTPATIENT)
Facility: HOSPITAL | Age: 35
Setting detail: HOSPITAL OUTPATIENT SURGERY
Discharge: HOME | End: 2020-06-03
Attending: ORTHOPAEDIC SURGERY | Admitting: ORTHOPAEDIC SURGERY

## 2020-06-03 VITALS
OXYGEN SATURATION: 94 % | HEART RATE: 84 BPM | DIASTOLIC BLOOD PRESSURE: 84 MMHG | TEMPERATURE: 98 F | SYSTOLIC BLOOD PRESSURE: 128 MMHG | RESPIRATION RATE: 16 BRPM

## 2020-06-03 DIAGNOSIS — M75.42 IMPINGEMENT SYNDROME OF LEFT SHOULDER: Primary | ICD-10-CM

## 2020-06-03 DIAGNOSIS — M75.40 IMPINGEMENT SYNDROME OF SHOULDER: ICD-10-CM

## 2020-06-03 PROCEDURE — 25010000003 CEFAZOLIN IN DEXTROSE 2-4 GM/100ML-% SOLUTION: Performed by: ORTHOPAEDIC SURGERY

## 2020-06-03 PROCEDURE — 25010000002 PHENYLEPHRINE PER 1 ML: Performed by: NURSE ANESTHETIST, CERTIFIED REGISTERED

## 2020-06-03 PROCEDURE — 25010000002 FENTANYL CITRATE (PF) 100 MCG/2ML SOLUTION: Performed by: ANESTHESIOLOGY

## 2020-06-03 PROCEDURE — 25010000002 DEXAMETHASONE PER 1 MG: Performed by: ANESTHESIOLOGY

## 2020-06-03 PROCEDURE — 25010000002 ONDANSETRON PER 1 MG: Performed by: NURSE ANESTHETIST, CERTIFIED REGISTERED

## 2020-06-03 PROCEDURE — 25010000002 EPINEPHRINE PER 0.1 MG: Performed by: ORTHOPAEDIC SURGERY

## 2020-06-03 PROCEDURE — 25010000002 ROPIVACAINE PER 1 MG: Performed by: ANESTHESIOLOGY

## 2020-06-03 PROCEDURE — 29823 SHO ARTHRS SRG XTNSV DBRDMT: CPT | Performed by: ORTHOPAEDIC SURGERY

## 2020-06-03 PROCEDURE — 25010000002 KETOROLAC TROMETHAMINE PER 15 MG: Performed by: NURSE ANESTHETIST, CERTIFIED REGISTERED

## 2020-06-03 PROCEDURE — 25010000002 MIDAZOLAM PER 1 MG: Performed by: ANESTHESIOLOGY

## 2020-06-03 PROCEDURE — 25010000002 FENTANYL CITRATE (PF) 100 MCG/2ML SOLUTION: Performed by: NURSE ANESTHETIST, CERTIFIED REGISTERED

## 2020-06-03 PROCEDURE — 25010000002 PROPOFOL 10 MG/ML EMULSION: Performed by: NURSE ANESTHETIST, CERTIFIED REGISTERED

## 2020-06-03 PROCEDURE — 25010000002 SUCCINYLCHOLINE PER 20 MG: Performed by: NURSE ANESTHETIST, CERTIFIED REGISTERED

## 2020-06-03 RX ORDER — PROMETHAZINE HYDROCHLORIDE 25 MG/1
25 SUPPOSITORY RECTAL ONCE AS NEEDED
Status: DISCONTINUED | OUTPATIENT
Start: 2020-06-03 | End: 2020-06-03 | Stop reason: HOSPADM

## 2020-06-03 RX ORDER — OXYCODONE HYDROCHLORIDE AND ACETAMINOPHEN 5; 325 MG/1; MG/1
1-2 TABLET ORAL EVERY 4 HOURS PRN
Qty: 50 TABLET | Refills: 0 | Status: SHIPPED | OUTPATIENT
Start: 2020-06-03 | End: 2020-07-21

## 2020-06-03 RX ORDER — PROPOFOL 10 MG/ML
VIAL (ML) INTRAVENOUS AS NEEDED
Status: DISCONTINUED | OUTPATIENT
Start: 2020-06-03 | End: 2020-06-03 | Stop reason: SURG

## 2020-06-03 RX ORDER — BUPIVACAINE HYDROCHLORIDE 2.5 MG/ML
INJECTION, SOLUTION EPIDURAL; INFILTRATION; INTRACAUDAL
Status: COMPLETED | OUTPATIENT
Start: 2020-06-03 | End: 2020-06-03

## 2020-06-03 RX ORDER — FENTANYL CITRATE 50 UG/ML
50 INJECTION, SOLUTION INTRAMUSCULAR; INTRAVENOUS
Status: DISCONTINUED | OUTPATIENT
Start: 2020-06-03 | End: 2020-06-03 | Stop reason: HOSPADM

## 2020-06-03 RX ORDER — HYDROCODONE BITARTRATE AND ACETAMINOPHEN 7.5; 325 MG/1; MG/1
1 TABLET ORAL EVERY 4 HOURS PRN
Status: DISCONTINUED | OUTPATIENT
Start: 2020-06-03 | End: 2020-06-03 | Stop reason: HOSPADM

## 2020-06-03 RX ORDER — PROMETHAZINE HYDROCHLORIDE 25 MG/ML
6.25 INJECTION, SOLUTION INTRAMUSCULAR; INTRAVENOUS ONCE AS NEEDED
Status: DISCONTINUED | OUTPATIENT
Start: 2020-06-03 | End: 2020-06-03 | Stop reason: HOSPADM

## 2020-06-03 RX ORDER — FAMOTIDINE 10 MG/ML
20 INJECTION, SOLUTION INTRAVENOUS ONCE
Status: COMPLETED | OUTPATIENT
Start: 2020-06-03 | End: 2020-06-03

## 2020-06-03 RX ORDER — SODIUM CHLORIDE, SODIUM LACTATE, POTASSIUM CHLORIDE, CALCIUM CHLORIDE 600; 310; 30; 20 MG/100ML; MG/100ML; MG/100ML; MG/100ML
9 INJECTION, SOLUTION INTRAVENOUS CONTINUOUS
Status: DISCONTINUED | OUTPATIENT
Start: 2020-06-03 | End: 2020-06-03 | Stop reason: HOSPADM

## 2020-06-03 RX ORDER — ENALAPRILAT 2.5 MG/2ML
0.62 INJECTION INTRAVENOUS ONCE AS NEEDED
Status: DISCONTINUED | OUTPATIENT
Start: 2020-06-03 | End: 2020-06-03 | Stop reason: HOSPADM

## 2020-06-03 RX ORDER — ONDANSETRON 4 MG/1
4 TABLET, FILM COATED ORAL EVERY 8 HOURS PRN
Qty: 20 TABLET | Refills: 0 | Status: SHIPPED | OUTPATIENT
Start: 2020-06-03 | End: 2020-06-15

## 2020-06-03 RX ORDER — SUCCINYLCHOLINE CHLORIDE 20 MG/ML
INJECTION INTRAMUSCULAR; INTRAVENOUS AS NEEDED
Status: DISCONTINUED | OUTPATIENT
Start: 2020-06-03 | End: 2020-06-03 | Stop reason: SURG

## 2020-06-03 RX ORDER — LIDOCAINE HYDROCHLORIDE 20 MG/ML
INJECTION, SOLUTION INFILTRATION; PERINEURAL AS NEEDED
Status: DISCONTINUED | OUTPATIENT
Start: 2020-06-03 | End: 2020-06-03 | Stop reason: SURG

## 2020-06-03 RX ORDER — ONDANSETRON 2 MG/ML
INJECTION INTRAMUSCULAR; INTRAVENOUS AS NEEDED
Status: DISCONTINUED | OUTPATIENT
Start: 2020-06-03 | End: 2020-06-03 | Stop reason: SURG

## 2020-06-03 RX ORDER — SODIUM CHLORIDE 0.9 % (FLUSH) 0.9 %
3 SYRINGE (ML) INJECTION EVERY 12 HOURS SCHEDULED
Status: DISCONTINUED | OUTPATIENT
Start: 2020-06-03 | End: 2020-06-03 | Stop reason: HOSPADM

## 2020-06-03 RX ORDER — ROCURONIUM BROMIDE 10 MG/ML
INJECTION, SOLUTION INTRAVENOUS AS NEEDED
Status: DISCONTINUED | OUTPATIENT
Start: 2020-06-03 | End: 2020-06-03 | Stop reason: SURG

## 2020-06-03 RX ORDER — KETOROLAC TROMETHAMINE 30 MG/ML
INJECTION, SOLUTION INTRAMUSCULAR; INTRAVENOUS AS NEEDED
Status: DISCONTINUED | OUTPATIENT
Start: 2020-06-03 | End: 2020-06-03 | Stop reason: SURG

## 2020-06-03 RX ORDER — MIDAZOLAM HYDROCHLORIDE 1 MG/ML
2 INJECTION INTRAMUSCULAR; INTRAVENOUS
Status: DISCONTINUED | OUTPATIENT
Start: 2020-06-03 | End: 2020-06-03 | Stop reason: HOSPADM

## 2020-06-03 RX ORDER — CEFAZOLIN SODIUM 2 G/100ML
2 INJECTION, SOLUTION INTRAVENOUS ONCE
Status: COMPLETED | OUTPATIENT
Start: 2020-06-03 | End: 2020-06-03

## 2020-06-03 RX ORDER — HYDROMORPHONE HYDROCHLORIDE 1 MG/ML
0.5 INJECTION, SOLUTION INTRAMUSCULAR; INTRAVENOUS; SUBCUTANEOUS
Status: DISCONTINUED | OUTPATIENT
Start: 2020-06-03 | End: 2020-06-03 | Stop reason: HOSPADM

## 2020-06-03 RX ORDER — SODIUM CHLORIDE 0.9 % (FLUSH) 0.9 %
3-10 SYRINGE (ML) INJECTION AS NEEDED
Status: DISCONTINUED | OUTPATIENT
Start: 2020-06-03 | End: 2020-06-03 | Stop reason: HOSPADM

## 2020-06-03 RX ORDER — DEXAMETHASONE SODIUM PHOSPHATE 4 MG/ML
INJECTION, SOLUTION INTRA-ARTICULAR; INTRALESIONAL; INTRAMUSCULAR; INTRAVENOUS; SOFT TISSUE
Status: COMPLETED | OUTPATIENT
Start: 2020-06-03 | End: 2020-06-03

## 2020-06-03 RX ORDER — FENTANYL CITRATE 50 UG/ML
INJECTION, SOLUTION INTRAMUSCULAR; INTRAVENOUS AS NEEDED
Status: DISCONTINUED | OUTPATIENT
Start: 2020-06-03 | End: 2020-06-03 | Stop reason: SURG

## 2020-06-03 RX ORDER — PROMETHAZINE HYDROCHLORIDE 25 MG/1
25 TABLET ORAL ONCE AS NEEDED
Status: DISCONTINUED | OUTPATIENT
Start: 2020-06-03 | End: 2020-06-03 | Stop reason: HOSPADM

## 2020-06-03 RX ORDER — ROPIVACAINE HYDROCHLORIDE 5 MG/ML
INJECTION, SOLUTION EPIDURAL; INFILTRATION; PERINEURAL
Status: COMPLETED | OUTPATIENT
Start: 2020-06-03 | End: 2020-06-03

## 2020-06-03 RX ORDER — ONDANSETRON 2 MG/ML
4 INJECTION INTRAMUSCULAR; INTRAVENOUS ONCE AS NEEDED
Status: DISCONTINUED | OUTPATIENT
Start: 2020-06-03 | End: 2020-06-03 | Stop reason: HOSPADM

## 2020-06-03 RX ORDER — MIDAZOLAM HYDROCHLORIDE 1 MG/ML
1 INJECTION INTRAMUSCULAR; INTRAVENOUS
Status: DISCONTINUED | OUTPATIENT
Start: 2020-06-03 | End: 2020-06-03 | Stop reason: HOSPADM

## 2020-06-03 RX ORDER — LIDOCAINE HYDROCHLORIDE 10 MG/ML
0.5 INJECTION, SOLUTION EPIDURAL; INFILTRATION; INTRACAUDAL; PERINEURAL ONCE AS NEEDED
Status: DISCONTINUED | OUTPATIENT
Start: 2020-06-03 | End: 2020-06-03 | Stop reason: HOSPADM

## 2020-06-03 RX ADMIN — ROPIVACAINE HYDROCHLORIDE 15 ML: 5 INJECTION, SOLUTION EPIDURAL; INFILTRATION; PERINEURAL at 08:38

## 2020-06-03 RX ADMIN — PROPOFOL 200 MG: 10 INJECTION, EMULSION INTRAVENOUS at 09:27

## 2020-06-03 RX ADMIN — DEXAMETHASONE SODIUM PHOSPHATE 2 MG: 4 INJECTION INTRA-ARTICULAR; INTRALESIONAL; INTRAMUSCULAR; INTRAVENOUS; SOFT TISSUE at 08:38

## 2020-06-03 RX ADMIN — FAMOTIDINE 20 MG: 10 INJECTION INTRAVENOUS at 08:33

## 2020-06-03 RX ADMIN — CEFAZOLIN SODIUM 2 G: 2 INJECTION, SOLUTION INTRAVENOUS at 09:31

## 2020-06-03 RX ADMIN — FENTANYL CITRATE 50 MCG: 50 INJECTION, SOLUTION INTRAMUSCULAR; INTRAVENOUS at 08:40

## 2020-06-03 RX ADMIN — ROCURONIUM BROMIDE 5 MG: 10 INJECTION, SOLUTION INTRAVENOUS at 09:27

## 2020-06-03 RX ADMIN — SODIUM CHLORIDE, POTASSIUM CHLORIDE, SODIUM LACTATE AND CALCIUM CHLORIDE: 600; 310; 30; 20 INJECTION, SOLUTION INTRAVENOUS at 09:22

## 2020-06-03 RX ADMIN — KETOROLAC TROMETHAMINE 30 MG: 30 INJECTION, SOLUTION INTRAMUSCULAR; INTRAVENOUS at 10:12

## 2020-06-03 RX ADMIN — SODIUM CHLORIDE, POTASSIUM CHLORIDE, SODIUM LACTATE AND CALCIUM CHLORIDE 9 ML/HR: 600; 310; 30; 20 INJECTION, SOLUTION INTRAVENOUS at 08:18

## 2020-06-03 RX ADMIN — FENTANYL CITRATE 100 MCG: 50 INJECTION INTRAMUSCULAR; INTRAVENOUS at 09:24

## 2020-06-03 RX ADMIN — FENTANYL CITRATE 50 MCG: 50 INJECTION, SOLUTION INTRAMUSCULAR; INTRAVENOUS at 08:30

## 2020-06-03 RX ADMIN — MIDAZOLAM 1 MG: 1 INJECTION INTRAMUSCULAR; INTRAVENOUS at 08:35

## 2020-06-03 RX ADMIN — ONDANSETRON HYDROCHLORIDE 4 MG: 2 SOLUTION INTRAMUSCULAR; INTRAVENOUS at 10:12

## 2020-06-03 RX ADMIN — PHENYLEPHRINE HYDROCHLORIDE 100 MCG: 10 INJECTION INTRAVENOUS at 10:12

## 2020-06-03 RX ADMIN — MIDAZOLAM 2 MG: 1 INJECTION INTRAMUSCULAR; INTRAVENOUS at 08:30

## 2020-06-03 RX ADMIN — MIDAZOLAM 1 MG: 1 INJECTION INTRAMUSCULAR; INTRAVENOUS at 08:40

## 2020-06-03 RX ADMIN — HYDROCODONE BITARTRATE AND ACETAMINOPHEN 1 TABLET: 7.5; 325 TABLET ORAL at 11:37

## 2020-06-03 RX ADMIN — BUPIVACAINE HYDROCHLORIDE 15 ML: 2.5 INJECTION, SOLUTION EPIDURAL; INFILTRATION; INTRACAUDAL; PERINEURAL at 08:38

## 2020-06-03 RX ADMIN — PHENYLEPHRINE HYDROCHLORIDE 100 MCG: 10 INJECTION INTRAVENOUS at 09:43

## 2020-06-03 RX ADMIN — SUCCINYLCHOLINE CHLORIDE 140 MG: 20 INJECTION, SOLUTION INTRAMUSCULAR; INTRAVENOUS at 09:27

## 2020-06-03 RX ADMIN — LIDOCAINE HYDROCHLORIDE 100 MG: 20 INJECTION, SOLUTION INFILTRATION; PERINEURAL at 09:27

## 2020-06-03 NOTE — H&P
History & Physical       Patient: Florin Edmonds    Date of Admission: 6/3/2020  6:57 AM    YOB: 1985    Medical Record Number: [id number]    Attending Physician: Katie Yousif MD        Chief Complaints: Impingement syndrome of shoulder [M75.40]      History of Present Illness: This patient has a several month history of left shoulder pain from a work injury.  He is failed conservative management we did have him schedule previously however had to cancel it due to a significant rash over his operative shoulder.  That has all cleared his symptoms are the same and he presents for shoulder arthroscopy most likely decompression however I did tell him that I will address any pathology that I find at the time of surgery     Allergies: No Known Allergies    Medications:   Home Medications:  No current facility-administered medications on file prior to encounter.      No current outpatient medications on file prior to encounter.     Current Medications:  Scheduled Meds:  ceFAZolin 2 g Intravenous Once   famotidine 20 mg Intravenous Once   sodium chloride 3 mL Intravenous Q12H     Continuous Infusions:  lactated ringers 9 mL/hr     PRN Meds:.fentanyl  •  lidocaine PF 1%  •  midazolam **OR** midazolam  •  sodium chloride    Past Medical History:   Diagnosis Date   • Anxiety    • Fragmented bone     BONE FRAGMENTS LEFT SHOULDER   • Left shoulder pain    • Needle phobia    • Scar tissue     LEFT SHOULDER        Past Surgical History:   Procedure Laterality Date   • DENTAL PROCEDURE      21 TEETH REMOVED        Social History     Occupational History   • Not on file   Tobacco Use   • Smoking status: Current Every Day Smoker     Packs/day: 2.00     Years: 7.00     Pack years: 14.00     Types: Electronic Cigarette   • Smokeless tobacco: Never Used   • Tobacco comment: QUIT CIGS 2014.   Substance and Sexual Activity   • Alcohol use: Yes     Comment: SOCIAL   • Drug use: Defer   • Sexual activity: Defer    Social  History     Social History Narrative   • Not on file        Family History   Adopted: Yes       Review of Systems      Physical Exam: 34 y.o. male  General Appearance:    Alert, cooperative, in no acute distress                    There were no vitals filed for this visit.     Head:    Normocephalic, without obvious abnormality, atraumatic   Eyes:            conjunctivae and sclerae normal, no pallor, corneas clear,    Ears:    Ears appear intact with no abnormalities noted   Throat:   No oral lesions, no thrush, oral mucosa moist   Neck:   No adenopathy, supple, trachea midline, no thyromegaly,    Back:     No kyphosis present, no scoliosis present, no skin lesions,      erythema or scars, no tenderness to percussion or                   palpation,   range of motion normal   Lungs:     Clear to auscultation,respirations regular, even and                  unlabored    Heart:    Regular rhythm and normal rate               Chest Wall:    No abnormalities observed   Abdomen:     Normal bowel sounds, no masses, no organomegaly, soft        non-tender, non-distended, no guarding, no rebound                tenderness   Rectal:     Deferred   Extremities:    Moves all extremities well, no edema,   no cyanosis, no redness   Pulses:   Pulses palpable and equal bilaterally   Skin:   No bleeding, bruising or rash   Lymph nodes:   No palpable adenopathy   Neurologic:   Appears neurologic intact             Assessment:  Patient Active Problem List   Diagnosis   • Tear of left glenoid labrum   • Traumatic tear of left rotator cuff   • Impingement syndrome of left shoulder   • Impingement syndrome of shoulder           Plan: All risks, benefits and alternatives were discussed.  Risks including to but not exclusive to anesthetic complications, including death, MI, CVA, infection, bleeding DVT, PE,  fracture, residual pain and need for future surgery.  Patient understood all and agrees to proceed.

## 2020-06-03 NOTE — DISCHARGE INSTRUCTIONS
What to expect after a Nerve Block    Nerve blocks administered to block pain affect many types of nerves, including those nerves that control movement, pain, and normal sensation. Following a nerve block, you may notice some bruising at the site where the block was given. You may experience sensations such as: numbness of the affected area or limb, tingling, heaviness (that is the limb feels heavy to you), weakness or inability to move the affected arm or leg, or a feeling as if your arm or leg has “fallen asleep.”     A nerve block can last from 2 to 36 hours depending on the medications used.  Usually the weakness wears off first followed by the tingling and heaviness. As the block wears off, you may begin to notice pain; however, this sequence of events may occur in any order. Typically, you will be able to move your limb before you will feel it. Once a nerve block begins to wear off, the effects are usually completely gone within 60 minutes.  If you experience continued side effects that you believe are block related for longer than 48 hours, please call your healthcare provider. Please see block-specific instructions below.    Instructions for any block involving the shoulder or arm  • If you have had any kind of shoulder/arm block, you will go home with your arm in a sling. Wear the sling until the block has completely worn off. You may be required to wear it for a longer period of time per your surgeon’s recommendations.  • If you have had a shoulder/arm block, it is a good idea to sleep on a recliner with pillows under your arm.    You may experience symptoms such as:  Shortness of breath  Hoarseness   Blurry vision  Unequal pupils  Drooping of your face on the same side as the block was performed    These are side effects associated with this kind of block and should go away within 12 hours.    Note: If you have severe or prolonged shortness of breath, please seek medical assistance as soon as  possible.     Protection of a “blocked” arm or leg (limb)  • After a nerve block, you cannot feel pain, pressure, or extremes of temperature in the affected limb. And because of this, your blocked limb is at more risk for injury. For example, it is possible to burn your limb on an extremely hot surface without feeling it.     • When resting, it is important to reposition your limb periodically to avoid prolonged pressure on it. This may require the use of pillows and padding.    • While sleeping, you should avoid rolling onto the affected limb or putting too much pressure on it.     • If you have a cast or tight dressing, check the color of your fingers or toes of the affected limb. Call your surgeon if they look discolored (that is, dusky, dark colored).    • Use caution in cold weather. Cover your limb appropriately to protect it from the cold.      Pain Management:    Your surgeon will give you a prescription for pain medication. Begin taking this before the nerve block wears off. Bear in mind that sometimes the block can wear off in the middle of the night.       Pendulum Exercises for Post Op Shoulder Surgery      1. Lean over with your good arm supported on a table or chair.  2. Relax the injured arm and allow it to hang straight down at your side.  3. Slowly begin to swing the relaxed arm back and forth.  Then swing it side to side.  Next, move it in a [place]. Now,  reverse the direction.        Generally, you should spend about 5 minutes doing this exercise.  It should be done 2-3 times daily or as directed by your physician.

## 2020-06-03 NOTE — OP NOTE
Shoulder Scope Decompression Operative Note      Facility: [facility]  Patient Name: Florin Edmonds  YOB: 1985  Date: 6/3/2020  Medical Record Number: [id number]      Pre-op Diagnosis:   Impingement syndrome of left shoulder [M75.40]    Post-Op Diagnosis Codes:     Same with degenerative anterior and superior labral tear    Procedure(s):  Left SHOULDER ARTHROSCOPY WITH SUBACROMIAL DECOMPRESSION and debridement of labrum    Surgeon(s):  Katie Yousif MD    Anesthesia: General  Anesthesiologist: Benja Finn MD  CRNA: Ofelia Vallejo CRNA    Staff:   Circulator: Mary Ramirez RN  Scrub Person: Shayy De Jesus  Assistants : None      Estimated Blood Loss: 5 cc    Specimens:   None     Drains: None    Findings: See Dictation    Complications: None    Indication for procedure:     This patient has had a several month history of left shoulder pain which is been unresponsive to conservative management. They have an MRI and an exam which are consistent with impingement and a present for arthroscopy. They understand all risks benefits and alternatives. Risk including but not exclusive to anesthetic complications including death MI CVA as well as infection bleeding, failure to relieve symptoms and need for future surgery. They understand these and agree to proceed.      Description of procedure:       Patient was taken to the operating room. They were placed supine on the operating room table. After induction of adequate LMA anesthesia, scalene nerve block and IV antibiotics, they underwent exam under anesthesia was demonstrated  full range of motion which was symmetric side to side. They were placed in the modified beachchair position all prominent areas well padded and head well stabilized. The arm was prepped and draped in the usual sterile fashion. Bony landmarks were demarcated and the joint was infiltrated with 30 cc of fluid. Standard posterior portal was made inferior and  medial to the posterior H acromion with a 11 blade. Blunt trocar penetrated into the joint scope following in our evaluation began.  Biceps tendon was normal subscap was normal articular surface was normal he did have a degenerative labral tear anterior and superior that was gently debrided with a motorized shaver after establishing an anterior portal with spinal needle localization direct visualization.  Rotator cuff appeared normal        I then exited the space entered the subacromial space. I made accessory lateral portal off the anterior lateral edge of the acromion placed the shaver and removed marked amount of thickened bursa. I delineated the anterior lateral edge of the acromion with the Opus device and resected the large spur that was present. The rotator cuff was normal from this vantage point. Everything was thoroughly irrigated, it was suctioned, the portals were closed with 3-0 nylon in interrupted fashion. Sterile dressings and a sling were applied. The patient tolerated this well, was taken to recovery room in good condition all sponge and needle count were correct.      Date: 6/3/2020  Time: 10:33

## 2020-06-03 NOTE — ANESTHESIA POSTPROCEDURE EVALUATION
Patient: Florin Edmonds    Procedure Summary     Date:  06/03/20 Room / Location:   [facility]  /  [facility]    Anesthesia Start:  0922 Anesthesia Stop:  1029    Procedure:  SHOULDER ARTHROSCOPY WITH SUBACROMIAL DECOMPRESSION (Left Shoulder) Diagnosis:       Impingement syndrome of shoulder      (Impingement syndrome of shoulder [M75.40])    Surgeon:  Katie Yousif MD Provider:  Benja Finn MD    Anesthesia Type:  general ASA Status:  2          Anesthesia Type: general    Vitals  Vitals Value Taken Time   /81 6/3/2020 11:00 AM   Temp 36.7 °C (98 °F) 6/3/2020 11:00 AM   Pulse 87 6/3/2020 11:02 AM   Resp 16 6/3/2020 11:00 AM   SpO2 95 % 6/3/2020 11:02 AM   Vitals shown include unvalidated device data.        Post Anesthesia Care and Evaluation    Patient location during evaluation: PHASE II  Patient participation: complete - patient participated  Level of consciousness: awake  Pain management: adequate  Airway patency: patent  Anesthetic complications: No anesthetic complications    Cardiovascular status: acceptable  Respiratory status: acceptable  Hydration status: acceptable    Comments: /81   Pulse 87   Temp 36.7 °C (98 °F) (Temporal)   Resp 16   SpO2 96%

## 2020-06-03 NOTE — ANESTHESIA PROCEDURE NOTES
Airway  Urgency: elective    Date/Time: 6/3/2020 9:29 AM  Airway not difficult    General Information and Staff    Patient location during procedure: OR  Anesthesiologist: Benja Finn MD    Indications and Patient Condition  Indications for airway management: airway protection    Preoxygenated: yes  Mask difficulty assessment: 1 - vent by mask    Final Airway Details  Final airway type: endotracheal airway      Successful airway: ETT  Cuffed: yes   Successful intubation technique: direct laryngoscopy  Facilitating devices/methods: intubating stylet and anterior pressure/BURP  Endotracheal tube insertion site: oral  Blade: Oscar  Blade size: 4  ETT size (mm): 7.5  Cormack-Lehane Classification: grade I - full view of glottis  Placement verified by: chest auscultation and capnometry   Measured from: lips  ETT/EBT  to lips (cm): 23  Number of attempts at approach: 1  Assessment: lips, teeth, and gum same as pre-op and atraumatic intubation

## 2020-06-03 NOTE — ANESTHESIA PREPROCEDURE EVALUATION
Anesthesia Evaluation     Patient summary reviewed and Nursing notes reviewed   NPO Solid Status: > 8 hours  NPO Liquid Status: > 2 hours           Airway   Mallampati: II  TM distance: >3 FB  Neck ROM: full  Dental - normal exam   (+) upper dentures    Pulmonary - normal exam    breath sounds clear to auscultation  (+) a smoker (14 pack year) Current Smoked day of surgery,   Cardiovascular - negative cardio ROS and normal exam    Rhythm: regular  Rate: normal    (-) angina, orthopnea, PND, CHILD      Neuro/Psych  (+) psychiatric history Anxiety,     GI/Hepatic/Renal/Endo - negative ROS     Musculoskeletal (-) negative ROS    Abdominal    Substance History - negative use     OB/GYN negative ob/gyn ROS         Other - negative ROS                       Anesthesia Plan    ASA 2     general   (ISB for PO pain)  intravenous induction     Anesthetic plan, all risks, benefits, and alternatives have been provided, discussed and informed consent has been obtained with: patient.

## 2020-06-03 NOTE — ANESTHESIA PROCEDURE NOTES
Peripheral Block      Patient reassessed immediately prior to procedure    Patient location during procedure: pre-op  Start time: 6/3/2020 8:30 AM  Stop time: 6/3/2020 8:38 AM  Reason for block: at surgeon's request and post-op pain management  Performed by  Anesthesiologist: Benja Finn MD  Preanesthetic Checklist  Completed: patient identified, site marked, surgical consent, pre-op evaluation, timeout performed, IV checked, risks and benefits discussed and monitors and equipment checked  Prep:  Pt Position: sitting  Sterile barriers:gloves and cap  Prep: ChloraPrep  Patient monitoring: blood pressure monitoring, continuous pulse oximetry and EKG  Procedure  Sedation:yes    Guidance:ultrasound guided  ULTRASOUND INTERPRETATION.  Using ultrasound guidance a 21 G gauge needle was placed in close proximity to the brachial plexus nerve, at which point, under ultrasound guidance anesthetic was injected in the area of the nerve and spread of the anesthesia was seen on ultrasound in close proximity thereto.  There were no abnormalities seen on ultrasound; a digital image was taken; and the patient tolerated the procedure with no complications. Images:still images obtained    Laterality:left  Block Type:interscalene  Injection Technique:single-shot  Needle Type:echogenic  Needle Gauge:21 G  Resistance on Injection: none    Medications Used: dexamethasone (DECADRON) injection, 2 mg  ropivacaine (NAROPIN) 0.5 % injection, 15 mL  bupivacaine PF (MARCAINE) 0.25 % injection, 15 mL  Med admintered at 6/3/2020 8:38 AM      Post Assessment  Injection Assessment: negative aspiration for heme, no paresthesia on injection and incremental injection  Patient Tolerance:comfortable throughout block  Complications:no  Additional Notes  Ultrasound interpretation:  Needle was seen in close proximity to nerve.  Local anesthetic seen surrounding area of nerve.  No abnormalities noted.  Still image obtained.

## 2020-06-15 ENCOUNTER — OFFICE VISIT (OUTPATIENT)
Dept: ORTHOPEDIC SURGERY | Facility: CLINIC | Age: 35
End: 2020-06-15

## 2020-06-15 VITALS — BODY MASS INDEX: 26.48 KG/M2 | HEIGHT: 70 IN | TEMPERATURE: 98.6 F | WEIGHT: 185 LBS

## 2020-06-15 DIAGNOSIS — Z98.890 S/P ARTHROSCOPY OF SHOULDER: Primary | ICD-10-CM

## 2020-06-15 PROCEDURE — 99024 POSTOP FOLLOW-UP VISIT: CPT | Performed by: ORTHOPAEDIC SURGERY

## 2020-06-15 NOTE — PROGRESS NOTES
Left Shoulder Scope follow Up 1st Visit      Patient: Florin Edmonds        YOB: 1985      Chief Complaints: left shoulder pain      History of Present Illness: Pt is here f/u shoulder arthroscopy decompression he states he is doing good still little bit of pain if he abducts but overall good he is off his pain medicine        Allergies: No Known Allergies    Medications:   Home Medications:  Current Outpatient Medications on File Prior to Visit   Medication Sig   • ibuprofen (ADVIL,MOTRIN) 200 MG tablet Take 200 mg by mouth Every 6 (Six) Hours As Needed for Mild Pain . PT HOLDING FOR SURGERY   • ondansetron (Zofran) 4 MG tablet Take 1 tablet by mouth Every 8 (Eight) Hours As Needed for Nausea or Vomiting.   • oxyCODONE-acetaminophen (PERCOCET) 5-325 MG per tablet Take 1-2 tablets by mouth Every 4 (Four) Hours As Needed for severe pain.     No current facility-administered medications on file prior to visit.      Current Medications:  Scheduled Meds:  Continuous Infusions:  No current facility-administered medications for this visit.   PRN Meds:.          Physical Exam: 34 y.o. male  General Appearance:    Alert, cooperative, in no acute distress                 There were no vitals filed for this visit.   Patient is alert and oriented ×3 no acute distress normal mood physical exam.  Physical exam of the shoulder, incisions looked good there is no erythema,no signs or sx of infection.    Assessment  S/P shoulder scope.  I did review intraoperative findings and arthroscopic pictures with the patient.          Plan: To remove sutures today place Steri-Strips and start into  physical therapy and I will have thrm follow up in 4 weeks.

## 2020-06-17 ENCOUNTER — TREATMENT (OUTPATIENT)
Dept: PHYSICAL THERAPY | Facility: CLINIC | Age: 35
End: 2020-06-17

## 2020-06-17 DIAGNOSIS — R68.89 IMPAIRED FUNCTION OF UPPER EXTREMITY: ICD-10-CM

## 2020-06-17 DIAGNOSIS — Z98.890 S/P ARTHROSCOPY OF SHOULDER: Primary | ICD-10-CM

## 2020-06-17 PROCEDURE — 97014 ELECTRIC STIMULATION THERAPY: CPT | Performed by: PHYSICAL THERAPIST

## 2020-06-17 PROCEDURE — 97110 THERAPEUTIC EXERCISES: CPT | Performed by: PHYSICAL THERAPIST

## 2020-06-17 PROCEDURE — 97140 MANUAL THERAPY 1/> REGIONS: CPT | Performed by: PHYSICAL THERAPIST

## 2020-06-17 PROCEDURE — 97161 PT EVAL LOW COMPLEX 20 MIN: CPT | Performed by: PHYSICAL THERAPIST

## 2020-06-17 NOTE — PROGRESS NOTES
Physical Therapy Initial Evaluation and Plan of Care    Patient: Florin Edmonds   : 1985  Diagnosis/ICD-10 Code:  S/P arthroscopy of shoulder [Z98.890]  Referring practitioner: Katei Yousif MD    Subjective Evaluation    History of Present Illness  Date of onset: 2018  Date of surgery: 6/3/2020  Mechanism of injury: Pulling leg out of box truck had sharp pain in the left shoulder.  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   Underwent multiple injections with temporary relief  Had actually been scheduled for surgery in February but was canceled at the last minute as he had some skin lesions and had to be cleared by dermatology to be able to be seen  Further delays with Covid epidemic  Underwent arthroscopy  6/3/20 - labral debridement, bone spur removal, SAD  Returned to MD 6/15/20 and was referred to PT  Will be moving out of town next month  Takes [employer] prn for pain          Patient Occupation: As of today has been let go from employer due to length of time off Pain  Current pain ratin  At best pain ratin  At worst pain rating: 10  Location: Anterior/superior shoulder, ulnar pain of foreram with elbow extension and finger extension, feels like he can not lift the arm, denies numbness or tingling in the arm  Quality: sharp, knife-like, dull ache, throbbing and tight (stabbing pain in ulnar forearm with actve finger extension and elbow extension)  Relieving factors: ice, change in position, rest and support  Aggravating factors: sleeping, movement, overhead activity and outstretched reach (strainghten elbow, attempts of forward and outward reaching)    Social Support  Lives with: spouse    Hand dominance: right    Diagnostic Tests  X-ray: abnormal  MRI studies: abnormal    Treatments  Previous treatment: medication and physical therapy  Current treatment: physical therapy  Patient Goals  Patient goal: get back to  functional level with his shoulder, full capacity use of arm           Objective          Postural Observations  Seated posture: poor  Standing posture: poor    Additional Postural Observation Details  Protracted shoulder posture, flexed at thoracic spine    Observations   Left Shoulder   Positive for atrophy.     Additional Observation Details  3 healing portal sites with steri strips, some atrophy of RTC and scapular muscles from disuse, some acne on the left shoulder    Palpation   Left   Hypertonic in the pectoralis major and upper trapezius.   Tenderness of the anterior deltoid, biceps and supraspinatus.     Cervical/Thoracic Screen   Cervical range of motion within normal limits    Neurological Testing     Sensation     Shoulder   Left Shoulder   Intact: light touch    Active Range of Motion   Left Shoulder   Flexion: 45 degrees with pain  Extension: 38 degrees   Abduction: 51 degrees with pain  External rotation 45°: 10 degrees with pain  Internal rotation 45°: 62 degrees with pain    Additional Active Range of Motion Details  Elbow extension lacks 10* from neutral after slow steady stretch into extension (initially lacked 25*)    Passive Range of Motion   Left Shoulder   Flexion: 105 degrees   Extension: 45 degrees   Abduction: 100 degrees   External rotation 45°: 12 degrees   Internal rotation 45°: 65 degrees     Joint Play   Left Shoulder  Hypomobile in the anterior capsule and posterior capsule.    Strength/Myotome Testing     Additional Strength Details  Unable to assess any shoulder motion as there was so much guarding with any shoulder movement  Had at least 3+/5 strength throughout the remainder of the UE but resistance force caused shoulder pain so more accurate testing could not be performed    Tests     Left Shoulder   Positive AC shear.     Additional Tests Details  Unable to perform many special tests due to guarding of the shoulder motion           Assessment & Plan     Assessment  Impairments:  abnormal muscle firing, abnormal muscle tone, abnormal or restricted ROM, activity intolerance, impaired physical strength, lacks appropriate home exercise program and pain with function  Assessment details: 34 y.o. Male seen 2 weeks post left shoulder arthroscopy and labral debridement/SAD presents with: 1. Constant left shoulder pain, 2. Decreased left shoulder active and passive motion, 3. Decreased left elbow extension due to constant use of sling and sharp pain in ulnar forearm flexor muscles with attempts of elbow extension and finger extension, 4. Severe guarding of shoulder movement,  5. Decreased tolerance for sleep and many normal ADL's due to pain  Prognosis: good  Functional Limitations: carrying objects, lifting, sleeping, pulling, pushing, uncomfortable because of pain, reaching behind back, reaching overhead and unable to perform repetitive tasks  Goals  Plan Goals: Short Term Goals: 3 weeks  Patient will be able to tolerate initial exercises  Patient will have pain <5/10  Patient will be able to sleep without pain interruptioin   Patient will be able to active flex to 120* without increased pain    Long Term Goals: 6 weeks  Patient will be independent in performing home exercise program.  Patient will have functional pain free shoulder AROM  Patient will be able to lift 20# to chest level without increased symptoms  Patient will be able to lift 40# to waist level without pain      Plan  Therapy options: will be seen for skilled physical therapy services  Planned modality interventions: cryotherapy and electrical stimulation/Russian stimulation  Planned therapy interventions: manual therapy, strengthening, home exercise program, stretching and joint mobilization  Frequency: 3x week  Duration in visits: 18  Duration in weeks: 6  Treatment plan discussed with: patient  Plan details: Patient issued written HEP of exercises performed in clinic today          Manual Therapy:    20     mins   10207;  Therapeutic Exercise:    20     mins  73579;     Neuromuscular Phuc:    0    mins  45932;    Therapeutic Activity:     0     mins  49240;       Evaluation Time:     20  mins  Timed Treatment:   40   mins   Total Treatment:     80   mins    PT SIGNATURE: Latoya Celaya, PT   DATE TREATMENT INITIATED: 6/17/2020    Initial Certification  Certification Period: 9/15/2020  I certify that the therapy services are furnished while this patient is under my care.  The services outlined above are required by this patient, and will be reviewed every 90 days.     PHYSICIAN: Katie Yousif MD      DATE:     Please sign and return via fax to [phone].. Thank you, [facility].

## 2020-06-22 ENCOUNTER — TREATMENT (OUTPATIENT)
Dept: PHYSICAL THERAPY | Facility: CLINIC | Age: 35
End: 2020-06-22

## 2020-06-22 DIAGNOSIS — R68.89 IMPAIRED FUNCTION OF UPPER EXTREMITY: ICD-10-CM

## 2020-06-22 DIAGNOSIS — Z98.890 S/P ARTHROSCOPY OF SHOULDER: Primary | ICD-10-CM

## 2020-06-22 PROCEDURE — 97110 THERAPEUTIC EXERCISES: CPT | Performed by: PHYSICAL THERAPIST

## 2020-06-22 PROCEDURE — 97140 MANUAL THERAPY 1/> REGIONS: CPT | Performed by: PHYSICAL THERAPIST

## 2020-06-22 NOTE — PROGRESS NOTES
Physical Therapy Daily Progress Note    VISIT#: 2    Subjective   Florin Edmonds reports: that he was quite sore after his last session.  Had a sharp pain after trying to catch a falling object at home.  Feels much better now than he did last week. States that he is still getting an occasional sharp pain with elbow and wrist extension but the frequency and intensity have both decreased. Pain still awakens him several times/night.      Objective   Passive flexion to 140* but took quite a bit to relax him to do so    Abl;e to tolerate elbow and wrist extension today with pain    See Exercise, Manual, and Modality Logs for complete treatment.     Patient Education: Required cueing for proper HEP technique    Assessment/Plan  Florin still exhibits considerable guarding and has a pinching sensation in about 90* elevation.   Sharp pain that was present in the forearm with elbow and finger extension has nearly resolved.      Progress strengthening /stabilization /functional activity           Manual Therapy:    15     mins  89315;  Therapeutic Exercise:    25     mins  59703;     Neuromuscular Phuc:    0    mins  27095;    Therapeutic Activity:     0     mins  15131;       Timed Treatment:   40   mins   Total Treatment:     65   mins    Latoya Celaya, PT  [place] License # [id number]  Physical Therapist

## 2020-06-24 ENCOUNTER — TREATMENT (OUTPATIENT)
Dept: PHYSICAL THERAPY | Facility: CLINIC | Age: 35
End: 2020-06-24

## 2020-06-24 DIAGNOSIS — R68.89 IMPAIRED FUNCTION OF UPPER EXTREMITY: ICD-10-CM

## 2020-06-24 DIAGNOSIS — Z98.890 S/P ARTHROSCOPY OF SHOULDER: Primary | ICD-10-CM

## 2020-06-24 PROCEDURE — 97014 ELECTRIC STIMULATION THERAPY: CPT | Performed by: PHYSICAL THERAPIST

## 2020-06-24 PROCEDURE — 97140 MANUAL THERAPY 1/> REGIONS: CPT | Performed by: PHYSICAL THERAPIST

## 2020-06-24 PROCEDURE — 97110 THERAPEUTIC EXERCISES: CPT | Performed by: PHYSICAL THERAPIST

## 2020-06-24 NOTE — PROGRESS NOTES
Physical Therapy Daily Progress Note    VISIT#: 3    Subjective   Florin Edmonds reports: that his arm feels tight in the elbow but the sharp pain that he was feeling in the biceps and forearm has resolved.  He notes that he is still unable to lift the arm due to weakness.  He notes that he still has sharp pain at end range elevation.      Objective   Passive shoulder flexion 154    Passive ER 50* at 45* abd    See Exercise, Manual, and Modality Logs for complete treatment.     Patient Education: need for ROM    Assessment/Plan  Florin has demonstrated increased passive motoin but still has considerable pain with attempts of active motion.  Very compliant with his therapy.    Progress strengthening /stabilization /functional activity           Manual Therapy:    15     mins  18634;  Therapeutic Exercise:    30     mins  99022;     Neuromuscular Phuc:    0    mins  48876;    Therapeutic Activity:     0     mins  06513;       Timed Treatment:   45   mins   Total Treatment:     70   mins    Latoya Celaya, PT  [place] License # [id number]  Physical Therapist

## 2020-06-26 ENCOUNTER — TREATMENT (OUTPATIENT)
Dept: PHYSICAL THERAPY | Facility: CLINIC | Age: 35
End: 2020-06-26

## 2020-06-26 DIAGNOSIS — R68.89 IMPAIRED FUNCTION OF UPPER EXTREMITY: ICD-10-CM

## 2020-06-26 DIAGNOSIS — Z98.890 S/P ARTHROSCOPY OF SHOULDER: Primary | ICD-10-CM

## 2020-06-26 PROCEDURE — 97110 THERAPEUTIC EXERCISES: CPT | Performed by: PHYSICAL THERAPIST

## 2020-06-26 PROCEDURE — 97140 MANUAL THERAPY 1/> REGIONS: CPT | Performed by: PHYSICAL THERAPIST

## 2020-06-26 PROCEDURE — 97014 ELECTRIC STIMULATION THERAPY: CPT | Performed by: PHYSICAL THERAPIST

## 2020-06-26 NOTE — PROGRESS NOTES
Physical Therapy Daily Progress Note    VISIT#: 4    Subjective   Florin Edmonds reports: that all of the forearm pain has resolved.  States that he still has a pinching sensation in the superior anterior shoulder region.with movement.  States that his pain is no longer constant but present with movement or with lying on the left side.  Reports that he is sleeping better as well.  Some biceps soreness from exercise.      Objective   Passive flexion to 165*, abduction 160*    Active flexion 138*    See Exercise, Manual, and Modality Logs for complete treatment.     Patient Education: wall wash for home    Assessment/Plan  Much improved activity tolerance. Active and passive motion has increased and strength has increased as well.     Progress strengthening /stabilization /functional activity  Add tubing exercises as tolerate next session         Manual Therapy:    15     mins  37290;  Therapeutic Exercise:    30     mins  80407;     Neuromuscular Phuc:    0    mins  50163;    Therapeutic Activity:     0     mins  06157;       Timed Treatment:   45   mins   Total Treatment:     75   mins    Latoya Celaya, PT  [place] License # [id number]  Physical Therapist

## 2020-06-29 ENCOUNTER — TREATMENT (OUTPATIENT)
Dept: PHYSICAL THERAPY | Facility: CLINIC | Age: 35
End: 2020-06-29

## 2020-06-29 DIAGNOSIS — R68.89 IMPAIRED FUNCTION OF UPPER EXTREMITY: ICD-10-CM

## 2020-06-29 DIAGNOSIS — Z98.890 S/P ARTHROSCOPY OF SHOULDER: Primary | ICD-10-CM

## 2020-06-29 PROCEDURE — 97110 THERAPEUTIC EXERCISES: CPT | Performed by: PHYSICAL THERAPIST

## 2020-06-29 PROCEDURE — 97140 MANUAL THERAPY 1/> REGIONS: CPT | Performed by: PHYSICAL THERAPIST

## 2020-06-29 NOTE — PROGRESS NOTES
Physical Therapy Daily Progress Note    VISIT#: 5    Subjective   Florin Edmonds reports: that awakened last night sleeping on the left shoulder.  He noticed additional stiffness but no sharp pain.  That is his normal sleeping position.  He states that he is able to use the arm more functionally but still is somewhat limited.       Objective   Able to hold arm in 90* flexion supine today without pain    See Exercise, Manual, and Modality Logs for complete treatment.     Patient Education: cont HEP    Assessment/Plan  Forearm pain has fully resolved.  Still tends to guard his shoulder movements quite a bit but does allow for more movement than his initial visit.      Progress strengthening /stabilization /functional activity  Start tubing exercises next visit         Manual Therapy:    15     mins  49575;  Therapeutic Exercise:    40     mins  55659;     Neuromuscular Phuc:    0    mins  91007;    Therapeutic Activity:     0     mins  83163;       Timed Treatment:   55   mins   Total Treatment:     70   mins    Latoya Celaya, PT  [place] License # [id number]  Physical Therapist

## 2020-07-01 ENCOUNTER — TREATMENT (OUTPATIENT)
Dept: PHYSICAL THERAPY | Facility: CLINIC | Age: 35
End: 2020-07-01

## 2020-07-01 DIAGNOSIS — R68.89 IMPAIRED FUNCTION OF UPPER EXTREMITY: ICD-10-CM

## 2020-07-01 DIAGNOSIS — Z98.890 S/P ARTHROSCOPY OF SHOULDER: Primary | ICD-10-CM

## 2020-07-01 PROCEDURE — 97110 THERAPEUTIC EXERCISES: CPT | Performed by: PHYSICAL THERAPIST

## 2020-07-01 PROCEDURE — 97140 MANUAL THERAPY 1/> REGIONS: CPT | Performed by: PHYSICAL THERAPIST

## 2020-07-01 NOTE — PROGRESS NOTES
Physical Therapy Daily Progress Note    VISIT#: 6    Subjective   Florin Edmonds reports: that his arm continues to improve.  States that he is sleeping better.  He reports that the pain is no longer constant and feels like he is moving the arm more frequently and fucntionally      Objective     Florin has considerable skin blemishes on the left shoulder and lateral upper arm    Seated active flexion 145* with strain    See Exercise, Manual, and Modality Logs for complete treatment.     Patient Education: cont to use arm at home    Assessment/Plan  Florin continues to improve as he is using the arm more functionally and has less pain.  Now 4 weeks post shoulder debridement.    Progress strengthening /stabilization /functional activity           Manual Therapy:    15     mins  45599;  Therapeutic Exercise:    40     mins  54320;     Neuromuscular Phuc:    0    mins  14086;    Therapeutic Activity:     0     mins  28980;       Timed Treatment:   55   mins   Total Treatment:     75   mins    Latoya Celaya, PT  [place] License # [id number]  Physical Therapist

## 2020-07-02 ENCOUNTER — TREATMENT (OUTPATIENT)
Dept: PHYSICAL THERAPY | Facility: CLINIC | Age: 35
End: 2020-07-02

## 2020-07-02 DIAGNOSIS — R68.89 IMPAIRED FUNCTION OF UPPER EXTREMITY: ICD-10-CM

## 2020-07-02 DIAGNOSIS — Z98.890 S/P ARTHROSCOPY OF SHOULDER: Primary | ICD-10-CM

## 2020-07-02 PROCEDURE — 97110 THERAPEUTIC EXERCISES: CPT | Performed by: PHYSICAL THERAPIST

## 2020-07-02 PROCEDURE — 97140 MANUAL THERAPY 1/> REGIONS: CPT | Performed by: PHYSICAL THERAPIST

## 2020-07-02 NOTE — PROGRESS NOTES
Physical Therapy Daily Progress Note    VISIT#: 7    Subjective   Florin Edmodns reports: that he was able to drive using both arms today without pain.      Objective   Florin has passive flexion of 165, abduction 160    See Exercise, Manual, and Modality Logs for complete treatment.     Patient Education: new IR stretch    Assessment/Plan  Florin has progressed in his range in all planes but is still quite limited in IR.  Guarding throughout all ranges however.not as much as it had been.    Progress strengthening /stabilization /functional activity           Manual Therapy:    15     mins  03247;  Therapeutic Exercise:    40     mins  97574;     Neuromuscular Phuc:    0    mins  82484;    Therapeutic Activity:     0     mins  72735;       Timed Treatment:   55   mins   Total Treatment:     70   mins    Latoya Celaya, PT  [place] License # [id number]  Physical Therapist

## 2020-07-10 ENCOUNTER — TREATMENT (OUTPATIENT)
Dept: PHYSICAL THERAPY | Facility: CLINIC | Age: 35
End: 2020-07-10

## 2020-07-10 DIAGNOSIS — Z98.890 S/P ARTHROSCOPY OF SHOULDER: Primary | ICD-10-CM

## 2020-07-10 DIAGNOSIS — R68.89 IMPAIRED FUNCTION OF UPPER EXTREMITY: ICD-10-CM

## 2020-07-10 PROCEDURE — 97112 NEUROMUSCULAR REEDUCATION: CPT | Performed by: PHYSICAL THERAPIST

## 2020-07-10 PROCEDURE — 97014 ELECTRIC STIMULATION THERAPY: CPT | Performed by: PHYSICAL THERAPIST

## 2020-07-10 PROCEDURE — 97110 THERAPEUTIC EXERCISES: CPT | Performed by: PHYSICAL THERAPIST

## 2020-07-10 PROCEDURE — 97140 MANUAL THERAPY 1/> REGIONS: CPT | Performed by: PHYSICAL THERAPIST

## 2020-07-10 NOTE — PROGRESS NOTES
Physical Therapy Daily Progress Note        VISIT#: 8      Florin Edmonds reports: Pt reports his shoulder is tight because he has been sitting at home not doing much. He is able to do more like reaching, and driving.   Current Pain Level:    1/10; Worst:   8/10; Best:  0/10  Location Of Pain: L shoulder  Response to Previous Session: No issues  Functional Deficits: Reaching, lifting, pushing and pulling,   Progression: good  Compliance with HEP Reported: Yes, intermittantly    Objective   Presents: Normal gait and arm swing  Increased sets/reps of:  none   Increased resistance on:  Bent over rows, Wall flexion  Added to Program: none    Home Exercises Given: Already has some      See Exercise, Manual, and Modality Logs for complete treatment.     Patient Education: Pt was educated on exercise biomechanical correctness, intensity, and speed.     Assessment:  Pt quite guarded during PROM but improved as manual therapy progressed and with slow, prolonged holds and gentle oscillations. He is limited in all planes of motion but especially IR. He was fatigued after several exercises so a couple were not completed. Noted pt very weak with IR and needed assist to bring his LUE behind his back to perform the IR stretch. Pt will continue to benefit from skilled PT to address current functional deficits and impairments.       Plan: Progress to/Continue with current program. Add some isometric ER/IR walkouts        Manual Therapy:    15     mins  07674;  Therapeutic Exercise:    25    mins  29739;     Neuromuscular Phuc:    15    mins  35768;    Therapeutic Activity:     0     mins  37684;     Electrical Stimulation: __15____ mins 62447  Ultrasound:   0 mins 42571  Timed Treatment:   70   mins   Total Treatment:     75   mins    Mer Pérez PTA  [place] License # [id number]  Physical Therapist Assistant

## 2020-07-13 ENCOUNTER — TREATMENT (OUTPATIENT)
Dept: PHYSICAL THERAPY | Facility: CLINIC | Age: 35
End: 2020-07-13

## 2020-07-13 DIAGNOSIS — R68.89 IMPAIRED FUNCTION OF UPPER EXTREMITY: ICD-10-CM

## 2020-07-13 DIAGNOSIS — Z98.890 S/P ARTHROSCOPY OF SHOULDER: Primary | ICD-10-CM

## 2020-07-13 PROCEDURE — 97014 ELECTRIC STIMULATION THERAPY: CPT | Performed by: PHYSICAL THERAPIST

## 2020-07-13 PROCEDURE — 97140 MANUAL THERAPY 1/> REGIONS: CPT | Performed by: PHYSICAL THERAPIST

## 2020-07-13 PROCEDURE — 97110 THERAPEUTIC EXERCISES: CPT | Performed by: PHYSICAL THERAPIST

## 2020-07-13 NOTE — PROGRESS NOTES
Physical Therapy Daily Progress Note    VISIT#: 9    Subjective   Florin Kendal reports: additional soreness last night after rolling over on the left shoulder while sleeping.        Objective   Full ER after stretch today    See Exercise, Manual, and Modality Logs for complete treatment.     Patient Education: new sleeper stretch     Assessment/Plan  Florin is demonstrating improved motion and decreased pain.  He still has considerable pain with end range IR and moderate pain with resisted ER    Progress strengthening /stabilization /functional activity           Manual Therapy:    15     mins  86731;  Therapeutic Exercise:    30     mins  07519;     Neuromuscular Phuc:    0    mins  72276;    Therapeutic Activity:     0     mins  16603;       Timed Treatment:   45   mins   Total Treatment:     65   mins    Latoya Celaya, PT  [place] License # [id number]  Physical Therapist

## 2020-07-15 ENCOUNTER — TREATMENT (OUTPATIENT)
Dept: PHYSICAL THERAPY | Facility: CLINIC | Age: 35
End: 2020-07-15

## 2020-07-15 DIAGNOSIS — R68.89 IMPAIRED FUNCTION OF UPPER EXTREMITY: ICD-10-CM

## 2020-07-15 DIAGNOSIS — Z98.890 S/P ARTHROSCOPY OF SHOULDER: Primary | ICD-10-CM

## 2020-07-15 PROCEDURE — 97140 MANUAL THERAPY 1/> REGIONS: CPT | Performed by: PHYSICAL THERAPIST

## 2020-07-15 PROCEDURE — 97014 ELECTRIC STIMULATION THERAPY: CPT | Performed by: PHYSICAL THERAPIST

## 2020-07-15 PROCEDURE — 97110 THERAPEUTIC EXERCISES: CPT | Performed by: PHYSICAL THERAPIST

## 2020-07-15 NOTE — PROGRESS NOTES
Physical Therapy Daily Progress Note    VISIT#: 10    Subjective   Florin Edmonds reports: that he is a bit sore today as he ws packing some boxes to move yesterday.  No heavy lifting or overhead activity.      Objective   Passive flexion to 160*, ER 80*    See Exercise, Manual, and Modality Logs for complete treatment.     Patient Education: stretch no pain    Assessment/Plan  Florin has some additional soreness upon awakening this morning but was able to complete full program except for serratus push without increased symptoms.     Progress strengthening /stabilization /functional activity           Manual Therapy:    15     mins  63632;  Therapeutic Exercise:    25/40     mins  02769;     Neuromuscular Phuc:    0    mins  15541;    Therapeutic Activity:     0     mins  01635;       Timed Treatment:   40   mins   Total Treatment:     75   mins    Latoya Celaya, PT  [place] License # [id number]  Physical Therapist

## 2020-07-17 ENCOUNTER — TREATMENT (OUTPATIENT)
Dept: PHYSICAL THERAPY | Facility: CLINIC | Age: 35
End: 2020-07-17

## 2020-07-17 DIAGNOSIS — R68.89 IMPAIRED FUNCTION OF UPPER EXTREMITY: ICD-10-CM

## 2020-07-17 DIAGNOSIS — Z98.890 S/P ARTHROSCOPY OF SHOULDER: Primary | ICD-10-CM

## 2020-07-17 PROCEDURE — 97112 NEUROMUSCULAR REEDUCATION: CPT | Performed by: PHYSICAL THERAPIST

## 2020-07-17 PROCEDURE — 97014 ELECTRIC STIMULATION THERAPY: CPT | Performed by: PHYSICAL THERAPIST

## 2020-07-17 PROCEDURE — 97110 THERAPEUTIC EXERCISES: CPT | Performed by: PHYSICAL THERAPIST

## 2020-07-17 PROCEDURE — 97140 MANUAL THERAPY 1/> REGIONS: CPT | Performed by: PHYSICAL THERAPIST

## 2020-07-20 ENCOUNTER — TREATMENT (OUTPATIENT)
Dept: PHYSICAL THERAPY | Facility: CLINIC | Age: 35
End: 2020-07-20

## 2020-07-20 DIAGNOSIS — R68.89 IMPAIRED FUNCTION OF UPPER EXTREMITY: ICD-10-CM

## 2020-07-20 DIAGNOSIS — Z98.890 S/P ARTHROSCOPY OF SHOULDER: Primary | ICD-10-CM

## 2020-07-20 PROCEDURE — 97530 THERAPEUTIC ACTIVITIES: CPT | Performed by: PHYSICAL THERAPIST

## 2020-07-20 PROCEDURE — 97140 MANUAL THERAPY 1/> REGIONS: CPT | Performed by: PHYSICAL THERAPIST

## 2020-07-20 PROCEDURE — 97110 THERAPEUTIC EXERCISES: CPT | Performed by: PHYSICAL THERAPIST

## 2020-07-20 NOTE — PROGRESS NOTES
MD Letter - Reassessment  Patient: Florin Edmonds   : 1985    Date of Initial Visit: Type: THERAPY  Noted: 2020  Today's Date: 2020  Patient seen for 12 sessions    Treatment has included: therapeutic exercise, neuromuscular re-education, manual therapy, electrical stimulation and cryotherapy    Subjective   Florin state that his shoulder feels much better than it did when he last saw you.  He states that his pain is no longer constant but is present with quick movements, outstretched and over head reaching and when he lies on it at night.  He reports that the pain is in the superior lateral aspect of the shoulder.   He denies any radicular symptoms.     Objective   Shoulder AROM/PROM - Flexion  163/165*,  Abduction 160/170*,  ER 80/84*,  IR 65/70*  Shoulder Strength - Flexion 4/5,  Abduction 4/5, Extension 5/5,  ER 4/5,  IR 5/5  Sensation - intact  Palpation - tenderness in the lateral subacromial space  Special tests - negative Patel Brent test, pain with Empty Can testing  Activity tolerances - he is able to lift 20# to chest level and 35# to waist level without increased pain.  He notes a sense of weakness rather than pain that is causing him to limit further attempts of lifting.     Assessment/Plan  Patient has demonstrated significant improvement since the initiation of therapy.  The pain has decreased in frequency and intensity.  The motion has increased in all planes of motion.  The activity tolerances have increased but not to desired levels.  I feel that the patient would benefit from continued therapy.  If you have any questions concerning the care, please do not hesitate to contact me.          PT Signature: Latoya Celaya, PT        Manual Therapy:    12     mins  00218;  Therapeutic Exercise:    30/40     mins  88686;     Neuromuscular Phuc:    0    mins  17894;    Therapeutic Activity:     10     mins  35413;   Assessed for MD    Timed Treatment:   52   mins   Total Treatment:     90    mins

## 2020-07-21 ENCOUNTER — OFFICE VISIT (OUTPATIENT)
Dept: ORTHOPEDIC SURGERY | Facility: CLINIC | Age: 35
End: 2020-07-21

## 2020-07-21 VITALS — WEIGHT: 182 LBS | TEMPERATURE: 98.7 F | BODY MASS INDEX: 26.05 KG/M2 | HEIGHT: 70 IN

## 2020-07-21 DIAGNOSIS — M75.02 ADHESIVE CAPSULITIS OF LEFT SHOULDER: ICD-10-CM

## 2020-07-21 DIAGNOSIS — Z98.890 S/P ARTHROSCOPY OF SHOULDER: Primary | ICD-10-CM

## 2020-07-21 PROCEDURE — 73030 X-RAY EXAM OF SHOULDER: CPT | Performed by: ORTHOPAEDIC SURGERY

## 2020-07-21 PROCEDURE — 99024 POSTOP FOLLOW-UP VISIT: CPT | Performed by: ORTHOPAEDIC SURGERY

## 2020-07-21 NOTE — PROGRESS NOTES
Left Shoulder Scope Follow Up       Patient: Florin Edmonds        YOB: 1985      Chief Complaints: left shoulder pain      History of Present Illness: Pt is here f/u shoulder arthroscopy he states he was doing better but now feels like he is lost some motion his pain is worsening a little bit has pain is mostly when he reaches or is at the end range of some motions he is still working with physical therapy        Allergies: No Known Allergies    Medications:   Home Medications:  Current Outpatient Medications on File Prior to Visit   Medication Sig   • ibuprofen (ADVIL,MOTRIN) 200 MG tablet Take 200 mg by mouth Every 6 (Six) Hours As Needed for Mild Pain . PT HOLDING FOR SURGERY   • oxyCODONE-acetaminophen (PERCOCET) 5-325 MG per tablet Take 1-2 tablets by mouth Every 4 (Four) Hours As Needed for severe pain.     No current facility-administered medications on file prior to visit.      Current Medications:  Scheduled Meds:  Continuous Infusions:  No current facility-administered medications for this visit.   PRN Meds:.          Physical Exam: 35 y.o. male  General Appearance:    Alert, cooperative, in no acute distress                 There were no vitals filed for this visit.   Patient is alert and oriented ×3 no acute distress normal mood physical exam.  Physical exam of the shoulder, incisions looked good there is no erythema,no signs or sx of infection.  Physical exam of the left shoulder reveals no overlying skin changes no lymphedema no lymphadenopathy.  Patient has active flexion 170 with mild symptoms abduction is similar external rotation is to 40 and internal rotation to the lower lumbar spine with mild symptoms.  Patient has good rotator cuff strength 4+ over 5 with isometric strength testing with pain.  Patient has a positive impingement and a positive Aptel sign.  Patient has good cervical range of motion which is full and asymptomatic no radicular symptoms.  Patient has a normal elbow  exam.  Good distal pulses are presentPatient has pain with overhead activity and a positive Neer sign and a positive empty can sign  They have a positive drop arm any definitive painful arc    X-rays AP scapular Y and axillary lateral left shoulder were taken to evaluate his symptoms and compared to previous films these are normal assessment  S/P shoulder scope.  I think he is doing okay I do think he has a little bit of capsulitis now with loss of range of motion I think that is a bigger part of his pain talked about a glenohumeral injection he wants to hold off on that.  He states he is going to get more diligent with his therapy at home I will see him back in 3 weeks to check on his range of motion      Plan: Continue Physical Therapy. I reiterated restrictions

## 2020-07-22 ENCOUNTER — TREATMENT (OUTPATIENT)
Dept: PHYSICAL THERAPY | Facility: CLINIC | Age: 35
End: 2020-07-22

## 2020-07-22 DIAGNOSIS — R68.89 IMPAIRED FUNCTION OF UPPER EXTREMITY: ICD-10-CM

## 2020-07-22 DIAGNOSIS — Z98.890 S/P ARTHROSCOPY OF SHOULDER: Primary | ICD-10-CM

## 2020-07-22 PROCEDURE — 97140 MANUAL THERAPY 1/> REGIONS: CPT | Performed by: PHYSICAL THERAPIST

## 2020-07-22 PROCEDURE — 97110 THERAPEUTIC EXERCISES: CPT | Performed by: PHYSICAL THERAPIST

## 2020-07-22 PROCEDURE — 97112 NEUROMUSCULAR REEDUCATION: CPT | Performed by: PHYSICAL THERAPIST

## 2020-07-22 NOTE — PROGRESS NOTES
Physical Therapy Daily Progress Note        VISIT#: 13      Florin Edmonds reports: Dr. Yousif instructed him to stretch more at home or he would need a cortisone shot. Stated he stretched pretty hard last night and did have a quick episode of pain that shot up to a 7/10 but quickly dissipated.   Current Pain Level:    0/10; Worst:   7/10 quick and does not last; Best:  0/10  Location Of Pain: L shoulder  Response to Previous Session: no issues. Good session.   Functional Deficits/Irritating Factors: Lifting, reaching  Progression: Good  Compliance with HEP Reported: Fair    Objective   Presents: Normal arm swing  Increased sets/reps of:  Overhead triceps extension   Increased resistance on:  Bent Over Rows, BTE ER/IR, Prone Ts and Ys  Added to Program: Quadriped shoulder flexion, perturbations to ball on wall, Prone Ys for low trap.          See Exercise, Manual, and Modality Logs for complete treatment.     Patient Education: Pt was educated on exercise biomechanical correctness, intensity, and speed. Reviewed flexion, abd, ER stretch w/cane for HEP and reviewed sleeper stretch (pt thought you laid in supine). Educated on the importance of BID stretches to prevent frozen shoulder.     Assessment:  Noted pain at end range during PROM in flexion, Abduction and IR. Some with ER but mostly tightness. Patient's shoulder appears to have some frozen shoulder issues Had to drop resistance on serratus punches secondary to UT substitution/shoulder elevation during the exercise. Cues throughout for scapular depression. Pt was also challenged with quadriped shoulder flexion when having to stabilize with L UE.  Pt will continue to benefit from skilled PT interventions to address current functional deficits and impairments.       Plan: Progress to/Continue with current program. Increase resistance levels as able and focus on stretching shoulder capsule and planes of  Motion.         Manual Therapy:    15     mins   64576;  Therapeutic Exercise:    30     mins  96772;     Neuromuscular Phuc:    25    mins  30105;    Therapeutic Activity:     0     mins  06525;     Electrical Stimulation: __0____ mins 68438  Ultrasound:   0 mins 91134  Timed Treatment:   70   mins   Total Treatment:     75   mins    Mer Pérez, [facility]  [place] License # [id number]  Physical Therapist Assistant

## 2020-07-24 ENCOUNTER — TREATMENT (OUTPATIENT)
Dept: PHYSICAL THERAPY | Facility: CLINIC | Age: 35
End: 2020-07-24

## 2020-07-24 DIAGNOSIS — Z98.890 S/P ARTHROSCOPY OF SHOULDER: Primary | ICD-10-CM

## 2020-07-24 DIAGNOSIS — R68.89 IMPAIRED FUNCTION OF UPPER EXTREMITY: ICD-10-CM

## 2020-07-24 PROCEDURE — 97112 NEUROMUSCULAR REEDUCATION: CPT | Performed by: PHYSICAL THERAPIST

## 2020-07-24 PROCEDURE — 97140 MANUAL THERAPY 1/> REGIONS: CPT | Performed by: PHYSICAL THERAPIST

## 2020-07-24 PROCEDURE — 97110 THERAPEUTIC EXERCISES: CPT | Performed by: PHYSICAL THERAPIST

## 2020-07-24 NOTE — PROGRESS NOTES
Physical Therapy Daily Progress Note        VISIT#: 14      Florin Edmonds reports: He had been doing his HEP consistently.   Current Pain Level:    0/10; Worst:   8/10 during horz add; Best:  0/10  Location Of Pain: L shoulder  Response to Previous Session: no Issues - good  Functional Deficits/Irritating Factors: reaching, lifting,   Progression: good  Compliance with HEP Reported: Yes    Objective   Presents: Normal arm swing. Forward, rounded shoulders with some thoracic kyphosis  Increased sets/reps of:  none   Increased resistance on:  none  Added to Program: none          See Exercise, Manual, and Modality Logs for complete treatment.     Patient Education: Pt was educated on exercise biomechanical correctness, intensity, and speed throughout session. Pt continues to need verbal and tactile cuiing for scapular depression but has improved greatly in awareness of scapular positioning.      Assessment:  Pt is doing better today with visual improvement in flexion and ER ROM. Will measure next session as pt was just measured this past Monday Pt also reported he is now able to lie his arm flat on door frame during pec stretches and he could not do that before. Pt also able to bring arm into IR up higher on his back today.   Pt will continue to benefit from skilled PT interventions to address current functional deficits and impairments.       Plan: Progress to/Continue with current program. Continue with manual stretching, especially posterior capsule        Manual Therapy:    15     mins  71404;  Therapeutic Exercise:    40     mins  12353;     Neuromuscular Phuc:    15    mins  82157;    Therapeutic Activity:     0     mins  35494;     Electrical Stimulation: __0____ mins 82160  Ultrasound:   0 mins 72870  Timed Treatment:   70   mins   Total Treatment:     75   mins    Mer Pérez PTA  [place] License # [id number]  Physical Therapist Assistant

## 2020-07-27 ENCOUNTER — TREATMENT (OUTPATIENT)
Dept: PHYSICAL THERAPY | Facility: CLINIC | Age: 35
End: 2020-07-27

## 2020-07-27 DIAGNOSIS — Z98.890 S/P ARTHROSCOPY OF SHOULDER: Primary | ICD-10-CM

## 2020-07-27 DIAGNOSIS — R68.89 IMPAIRED FUNCTION OF UPPER EXTREMITY: ICD-10-CM

## 2020-07-27 PROCEDURE — 97140 MANUAL THERAPY 1/> REGIONS: CPT | Performed by: PHYSICAL THERAPIST

## 2020-07-27 PROCEDURE — 97110 THERAPEUTIC EXERCISES: CPT | Performed by: PHYSICAL THERAPIST

## 2020-07-27 PROCEDURE — 97112 NEUROMUSCULAR REEDUCATION: CPT | Performed by: PHYSICAL THERAPIST

## 2020-07-27 NOTE — PROGRESS NOTES
Physical Therapy Daily Progress Note        VISIT#: 15      Florin Edmonds reports: He is doing better. Some pain when he woke up this morning but it goes away after moving around for awhile. Stated he had no issues moving boxes. Reported he is doing his stretches.    Current Pain Level:    0/10; Worst:  6 /10 (throbbing at times but doesn't last); Best:  0/10  Location Of Pain: R shoulder  Response to Previous Session: No issues  Functional Deficits/Irritating Factors: Reaching  Progression: good  Compliance with HEP Reported: Yes    Objective   Presents: Normal arm swing  Increased sets/reps of:  Most exercises increased to 18 reps   Increased resistance on:  Quadruped reaching, BTE ER/IR    Added to Program: Progressed Rocker board to UE step ups w/shifting, ER/IR at 90/90 w/tubing          See Exercise, Manual, and Modality Logs for complete treatment.     Patient Education: Pt was educated on exercise biomechanical correctness, intensity, and speed.     Assessment:  Pt was able to tolerate increase in reps for many exercises and may be able to progress resistance levels next session. His ROM has improved. He is able to each behind his back to the sacral level, his ER is WNL. His flexion is also WNL but pain at end range. Pt IR remains limited and with pain. He is progressing better since he reported compliance with HEP. Posture awareness has also improved a great deal and motivated to improve it. Pt will continue to benefit from skilled PT interventions to address current functional deficits and impairments.       Plan: Progress to/Continue with current program. Measure AROM. Increase resistance to some of the exercises        Manual Therapy:    10     mins  37879;  Therapeutic Exercise:    30     mins  37856;     Neuromuscular Phuc:    15    mins  98098;    Therapeutic Activity:     0     mins  90226;     Electrical Stimulation: __0____ mins 35678  Ultrasound:   0 mins 26613  Timed Treatment:   55   mins   Total  Treatment:     60   mins    Mer Pérez, [facility]  [place] License # [id number]  Physical Therapist Assistant

## 2020-07-29 ENCOUNTER — TREATMENT (OUTPATIENT)
Dept: PHYSICAL THERAPY | Facility: CLINIC | Age: 35
End: 2020-07-29

## 2020-07-29 DIAGNOSIS — Z98.890 S/P ARTHROSCOPY OF SHOULDER: Primary | ICD-10-CM

## 2020-07-29 DIAGNOSIS — R68.89 IMPAIRED FUNCTION OF UPPER EXTREMITY: ICD-10-CM

## 2020-07-29 PROCEDURE — 97140 MANUAL THERAPY 1/> REGIONS: CPT | Performed by: PHYSICAL THERAPIST

## 2020-07-29 PROCEDURE — 97110 THERAPEUTIC EXERCISES: CPT | Performed by: PHYSICAL THERAPIST

## 2020-07-29 NOTE — PROGRESS NOTES
Physical Therapy Daily Progress Note    VISIT#: 16    Subjective   Florin Edmonds reports: that he is improving as he is able to move the arm with less restriction and ha less pain in general.  States that he is moving in 2 weeks and is questioning the process of getting into new PT clinic near [facility].      Objective   Tightness in all end ranges of motion but much less pain than before    See Exercise, Manual, and Modality Logs for complete treatment.     Patient Education: remember Sleeper stretch     Assessment/Plan  Florin continues to demonstrate improvement in the function of his shoulder.  He was able to increase resistance with many exercises today without increased symptoms.     Progress strengthening /stabilization /functional activity           Manual Therapy:    20     mins  41806;  Therapeutic Exercise:    40/50     mins  43434;     Neuromuscular Phuc:    0    mins  83375;    Therapeutic Activity:     0     mins  71206;       Timed Treatment:   60   mins   Total Treatment:     90   mins    Latoya Celaya, PT  [place] License # [id number]  Physical Therapist

## 2020-07-30 ENCOUNTER — TREATMENT (OUTPATIENT)
Dept: PHYSICAL THERAPY | Facility: CLINIC | Age: 35
End: 2020-07-30

## 2020-07-30 DIAGNOSIS — R68.89 IMPAIRED FUNCTION OF UPPER EXTREMITY: ICD-10-CM

## 2020-07-30 DIAGNOSIS — Z98.890 S/P ARTHROSCOPY OF SHOULDER: Primary | ICD-10-CM

## 2020-07-30 PROCEDURE — 97110 THERAPEUTIC EXERCISES: CPT | Performed by: PHYSICAL THERAPIST

## 2020-07-30 PROCEDURE — 97140 MANUAL THERAPY 1/> REGIONS: CPT | Performed by: PHYSICAL THERAPIST

## 2020-07-30 NOTE — PROGRESS NOTES
Physical Therapy Daily Progress Note    VISIT#: 17    Subjective   Florin Edmonds reports: that he is feeling better and knows that his motion is continuing to improve.       Objective   Able to get arm to waist level behind back today    See Exercise, Manual, and Modality Logs for complete treatment.     Patient Education:    Assessment/Plan  Florin continues to make gains in the strength and motion of the shoulder.  Very compliant with HEP now.     Progress strengthening /stabilization /functional activity           Manual Therapy:    15     mins  99377;  Therapeutic Exercise:    40/50     mins  32705;     Neuromuscular Phuc:    0    mins  72989;    Therapeutic Activity:     0     mins  43881;       Timed Treatment:   55   mins   Total Treatment:     75   mins    Latoya Celaya, PT  [place] License # [id number]  Physical Therapist

## 2020-08-03 ENCOUNTER — TREATMENT (OUTPATIENT)
Dept: PHYSICAL THERAPY | Facility: CLINIC | Age: 35
End: 2020-08-03

## 2020-08-03 DIAGNOSIS — Z98.890 S/P ARTHROSCOPY OF SHOULDER: Primary | ICD-10-CM

## 2020-08-03 DIAGNOSIS — R68.89 IMPAIRED FUNCTION OF UPPER EXTREMITY: ICD-10-CM

## 2020-08-03 PROCEDURE — 97530 THERAPEUTIC ACTIVITIES: CPT | Performed by: PHYSICAL THERAPIST

## 2020-08-03 PROCEDURE — 97110 THERAPEUTIC EXERCISES: CPT | Performed by: PHYSICAL THERAPIST

## 2020-08-03 PROCEDURE — 97140 MANUAL THERAPY 1/> REGIONS: CPT | Performed by: PHYSICAL THERAPIST

## 2020-08-03 PROCEDURE — 97112 NEUROMUSCULAR REEDUCATION: CPT | Performed by: PHYSICAL THERAPIST

## 2020-08-03 NOTE — PROGRESS NOTES
Physical Therapy Daily Progress Note        VISIT#: 18      Florin Edmonds reports: He believes he is getting stronger and he can reach behind his back higher than he use to be able to do.   Current Pain Level:   0 /10; Worst:   1-2/10; Best:  0/10  Location Of Pain: L shoulder  Response to Previous Session: No issues. Good response  Functional Deficits/Irritating Factors: weakness  Progression: Good  Compliance with HEP Reported: Yes    Objective   Presents: Pleasant affect. Normal arm swing. Rounded shoulders,   Increased sets/reps of:  ER/IR at 90/90 progressed to AROM and increased reps  Increased resistance on:  Bicep curls, prone Y's, SL ER, BTE  Added to Program: Changed Serratus punches back to supine secondary to pt compensating with UTs using the machine          See Exercise, Manual, and Modality Logs for complete treatment.     Patient Education: Pt was educated on exercise biomechanical correctness, intensity, and speed.     Assessment:  Overall ROM is doing well and WFL except IR. Pt continues to be tight in his posterior capsule. Pt did well with progressions in his exercises. Pt will continue to benefit from skilled PT interventions to address current functional deficits and impairments.       Plan: Progress to/Continue with current program. Progress resistance as pt is able. Add resistance to corner wall flexion/scaption.         Manual Therapy:    15     mins  73788;  Therapeutic Exercise:    45     mins  86489;     Neuromuscular Phuc:    15    mins  93230;    Therapeutic Activity:     15     mins  37655;     Electrical Stimulation: __0____ mins 72751  Ultrasound:   0 mins 28632  Timed Treatment:   90   mins   Total Treatment:     95   mins    Mer Pérez PTA  [place] License # [id number]  Physical Therapist Assistant

## 2020-08-05 ENCOUNTER — TREATMENT (OUTPATIENT)
Dept: PHYSICAL THERAPY | Facility: CLINIC | Age: 35
End: 2020-08-05

## 2020-08-05 DIAGNOSIS — Z98.890 S/P ARTHROSCOPY OF SHOULDER: Primary | ICD-10-CM

## 2020-08-05 DIAGNOSIS — R68.89 IMPAIRED FUNCTION OF UPPER EXTREMITY: ICD-10-CM

## 2020-08-05 PROCEDURE — 97110 THERAPEUTIC EXERCISES: CPT | Performed by: PHYSICAL THERAPIST

## 2020-08-05 PROCEDURE — 97112 NEUROMUSCULAR REEDUCATION: CPT | Performed by: PHYSICAL THERAPIST

## 2020-08-05 PROCEDURE — 97140 MANUAL THERAPY 1/> REGIONS: CPT | Performed by: PHYSICAL THERAPIST

## 2020-08-05 NOTE — PROGRESS NOTES
Physical Therapy Daily Progress Note        VISIT#: 19      Florin Edmonds reports: He is tired today. Stated his shoulder is doing well. He gets a twinge of pain every now and then.   Current Pain Level:   0 /10; Worst:   1-2/10; Best:  0/10  Location Of Pain: L shoulder  Response to Previous Session: Good. No issues  Functional Deficits/Irritating Factors: Weakness  Progression: Good  Compliance with HEP Reported: Yes    Objective   Presents: Pleasant affect. Forward Head, rounded shoulders but improving  Increased sets/reps of:  Quadruped shoulder flexion, UE step ups,    Increased resistance on:  Shoulder flexion on wall  Added to Program: Added ER/IR at side again.           See Exercise, Manual, and Modality Logs for complete treatment.     Patient Education: Pt was educated on exercise biomechanical correctness, intensity, and speed.     Assessment:  Patient's posture awareness and positioning have improved a great deal. He also continues to improve in strength and endurance based on his progression with his exercises. Pt close to d/c from PT secondary to moving to another city. Pt will continue to benefit from skilled PT interventions to address current functional deficits and impairments.       Plan: Progress to/Continue with current program. Assess Response to today's session. Continue to progress sets and reps. Add ball toss on trampoline        Manual Therapy:    10     mins  29432;  Therapeutic Exercise:    30     mins  74480;     Neuromuscular Phuc:    15    mins  96156;    Therapeutic Activity:     0     mins  51530;     Electrical Stimulation: __0____ mins 72066  Ultrasound:   0 mins 95273  Timed Treatment:   55   mins   Total Treatment:     60   mins    ESTRADA Wright License # [id number]  Physical Therapist Assistant

## 2020-08-07 ENCOUNTER — TREATMENT (OUTPATIENT)
Dept: PHYSICAL THERAPY | Facility: CLINIC | Age: 35
End: 2020-08-07

## 2020-08-07 DIAGNOSIS — Z98.890 S/P ARTHROSCOPY OF SHOULDER: Primary | ICD-10-CM

## 2020-08-07 DIAGNOSIS — R68.89 IMPAIRED FUNCTION OF UPPER EXTREMITY: ICD-10-CM

## 2020-08-07 PROCEDURE — 97110 THERAPEUTIC EXERCISES: CPT | Performed by: PHYSICAL THERAPIST

## 2020-08-07 PROCEDURE — 97112 NEUROMUSCULAR REEDUCATION: CPT | Performed by: PHYSICAL THERAPIST

## 2020-08-07 PROCEDURE — 97140 MANUAL THERAPY 1/> REGIONS: CPT | Performed by: PHYSICAL THERAPIST

## 2020-08-07 NOTE — PROGRESS NOTES
MD Letter  Patient: Florin Edmonds   : 1985    Date of Initial Visit: Type: THERAPY  Noted: 2020  Today's Date: 2020  Patient seen for 20 sessions    Treatment has included: Therapeutic exercise, Therapeutic Activities, Manual therapy, NMR, Ice      Subjective Pt reports his shoulder is doing really well. His current and best pain levels are both 0/10. He moved a lot of boxes yesterday and he has no pain. Occasionally he gets twinges of pain of 1-2/10.     Objective   AROM - Measured in supine:  L shoulder flexion = 163 deg; abduction =  178 Deg; ER = 78 deg; IR = 48 deg (L4/L5).   Strength - Flexion = 4/5; Abduction =  4/5; ER = 4/5; IR =  5/5  Sensation - Intact  Palpation - No tenderness   Activity tolerances - Good  Increased resistance and sets on several exercises (see chart)    Assessment/Plan  Patient has demonstrated very good improvement since the initiation of therapy.  The pain has subsided.  The motion has vastly improved.  The activity tolerances have increased a great deal.  I feel that the patient would benefit from continued PT to address strength and endurance deficits. Pt will be moving to another [facility] so he will be d/c'd from our clinic but do recommend continued therapy in another facility.  If you have any questions concerning the care, please do not hesitate to contact me.          PT Signature: Mer Pérez PTA        Manual Therapy:    10     mins  92966;  Therapeutic Exercise:    30     mins  88906;     Neuromuscular Phuc:    15    mins  51914;    Therapeutic Activity:     0     mins  48220;     Electrical Stimulation:    0     mins 94318  Timed Treatment:   55   mins   Total Treatment:     65   mins

## 2020-08-07 NOTE — PROGRESS NOTES
Discharge Summary  Discharge Summary from Physical Therapy Report    Patient Information  Florin Edmonds  1985    Dates  PT visit: 6/17/2020 to 8/7/2020  Number of Visits: 22     Discharge Status of Patient: See MD Note dated 8/7/2020    Goals: Partially met  Visit Diagnoses:    ICD-10-CM ICD-9-CM   1. S/P arthroscopy of shoulder Z98.890 V45.89   2. Impaired function of upper extremity R68.89 V49.5       Discharge Plan: Discharged with HEP. Pt will continue PT at local hospital where he is  moving  Comments Pt has  Made excellent progress. He will benefit from additional PT to address remaining strength and endurance deficits.     Date of Discharge 8/7/2020        Mer Pérez, ESTRADA  Physical Therapist

## 2020-08-11 ENCOUNTER — OFFICE VISIT (OUTPATIENT)
Dept: ORTHOPEDIC SURGERY | Facility: CLINIC | Age: 35
End: 2020-08-11

## 2020-08-11 VITALS — TEMPERATURE: 97.5 F | WEIGHT: 183.4 LBS | HEIGHT: 70 IN | BODY MASS INDEX: 26.26 KG/M2

## 2020-08-11 DIAGNOSIS — Z98.890 S/P ARTHROSCOPY OF SHOULDER: Primary | ICD-10-CM

## 2020-08-11 PROCEDURE — 99024 POSTOP FOLLOW-UP VISIT: CPT | Performed by: ORTHOPAEDIC SURGERY

## 2020-08-11 NOTE — PROGRESS NOTES
Left Shoulder Scope Follow Up       Patient: Florin Edmonds        YOB: 1985      Chief Complaints: left shoulder pain      History of Present Illness: Pt is here f/u shoulder arthroscopy he is doing good states he still needs to get a little more strength but overall is doing quite good happy with where he is he recently moved out of town        Allergies: No Known Allergies    Medications:   Home Medications:  Current Outpatient Medications on File Prior to Visit   Medication Sig   • ibuprofen (ADVIL,MOTRIN) 200 MG tablet Take 200 mg by mouth Every 6 (Six) Hours As Needed for Mild Pain . PT HOLDING FOR SURGERY     No current facility-administered medications on file prior to visit.      Current Medications:  Scheduled Meds:  Continuous Infusions:  No current facility-administered medications for this visit.   PRN Meds:.          Physical Exam: 35 y.o. male  General Appearance:    Alert, cooperative, in no acute distress                 There were no vitals filed for this visit.   Patient is alert and oriented ×3 no acute distress normal mood physical exam.  Physical exam of the shoulder, incisions looked good there is no erythema,no signs or sx of infection.  Shoulder exam looks great range of motion is good strength is markedly improved endurance is still little down but overall much better  Assessment  S/P shoulder scope.  I think he looks great        Plan: Continue strengthening exercises progressed to a home program and I will place him at [facility].  If work would like me to do a percent impairment I am happy to do so

## 2020-09-22 ENCOUNTER — TELEPHONE (OUTPATIENT)
Dept: ORTHOPEDIC SURGERY | Facility: CLINIC | Age: 35
End: 2020-09-22

## 2020-09-22 NOTE — TELEPHONE ENCOUNTER
----- Message from Florin Edmonds sent at 9/22/2020 12:46 PM EDT -----  Regarding: Non-Urgent Medical Question  Contact: [phone]  MY CHART MESSAGE:    My work comp said that they had not received an impairment rating and wasn't sure if I needed to do anything on my end for that.

## 2020-09-30 NOTE — TELEPHONE ENCOUNTER
PATIENT IS CALLING IN REGARDS TO THE STATUS OF HIS W/C IMPAIRMENT RATING, THROUGH [employer].  [employer] HAS REACHED OUT TO THE PATIENT STATING THEY ARE STILL WAITING TO RECEIVE THIS INFORMATION.    PLEASE ADVISE AND CALL   PATIENT

## 2020-10-27 ENCOUNTER — TELEPHONE (OUTPATIENT)
Dept: ORTHOPEDICS | Facility: OTHER | Age: 35
End: 2020-10-27

## 2020-10-27 NOTE — TELEPHONE ENCOUNTER
PT. HAD LEFT SHOULDER SURGERY WITH DR. CARRION IN June. STATES THAT HE WAS RELEASED BACK TO WORK FULL DUTY.   HE HAS BEEN HAVING SOME PAIN IN HIS LEFT SHOULDER.   PT. STATES THAT HE HAS MOVED ABOUT 2 HOURS AWAY AND IS ASKING IF HE CAN DO A VIRTUAL VISIT WITH DR. CARRION.   PLEASE CALL TO ADVISE.   [phone].#

## 2020-11-30 ENCOUNTER — DOCUMENTATION (OUTPATIENT)
Dept: PHYSICAL THERAPY | Facility: CLINIC | Age: 35
End: 2020-11-30

## (undated) DEVICE — KT POSTN ARM TRIMANO BEACH CHR W/DRP

## (undated) DEVICE — ABL ASP APOLLO RF XL 90D

## (undated) DEVICE — DRSNG WND GZ CURAD OIL EMULSION 3X3IN STRL

## (undated) DEVICE — SKIN PREP TRAY W/CHG: Brand: MEDLINE INDUSTRIES, INC.

## (undated) DEVICE — GLV SURG SENSICARE POLYISPRN W/ALOE PF LF 6.5 GRN STRL

## (undated) DEVICE — SUT ETHLN 3/0 PS1 18IN 1663H

## (undated) DEVICE — ABL APOLLO RF M/PRT 50D

## (undated) DEVICE — GLV SURG BIOGEL LTX PF 6 1/2

## (undated) DEVICE — ARM SLING: Brand: DEROYAL

## (undated) DEVICE — PK ARTHSCP SHLDR TOWER 40

## (undated) DEVICE — STCKNT IMPERV 9X36IN STRL